# Patient Record
Sex: MALE | Race: WHITE | HISPANIC OR LATINO | Employment: UNEMPLOYED | URBAN - METROPOLITAN AREA
[De-identification: names, ages, dates, MRNs, and addresses within clinical notes are randomized per-mention and may not be internally consistent; named-entity substitution may affect disease eponyms.]

---

## 2020-10-07 ENCOUNTER — HOSPITAL ENCOUNTER (INPATIENT)
Facility: MEDICAL CENTER | Age: 50
LOS: 4 days | DRG: 177 | End: 2020-10-12
Attending: EMERGENCY MEDICINE | Admitting: INTERNAL MEDICINE
Payer: OTHER GOVERNMENT

## 2020-10-07 DIAGNOSIS — J18.9 PNEUMONIA OF LOWER LOBE DUE TO INFECTIOUS ORGANISM, UNSPECIFIED LATERALITY: ICD-10-CM

## 2020-10-07 DIAGNOSIS — U07.1 COVID-19: ICD-10-CM

## 2020-10-07 DIAGNOSIS — J96.01 ACUTE RESPIRATORY FAILURE WITH HYPOXIA (HCC): ICD-10-CM

## 2020-10-07 PROCEDURE — 96375 TX/PRO/DX INJ NEW DRUG ADDON: CPT

## 2020-10-07 PROCEDURE — 96374 THER/PROPH/DIAG INJ IV PUSH: CPT

## 2020-10-07 PROCEDURE — 99285 EMERGENCY DEPT VISIT HI MDM: CPT

## 2020-10-07 NOTE — LETTER
10/13/2020               Chao Loving  [address] [facility] [address]        Dear Chao (MR#[id number]),      This letter is to inform you that your COVID-19 test result is POSITIVE.  This means that the virus that causes COVID-19 was found in your sample.      A review of your test during your recent visit requires that we notify you of the following:    Your nasal swab was POSITIVE for the novel coronavirus (COVID-19).     The Health Department will be in contact with you soon.      You are encouraged to continue to quarantine and self-isolate according to the CDC guidance unless otherwise directed by the Health Department.  Per the CDC, you should continue to quarantine until: At least 3 days (72 hours) have passed since your fever resolved without the use of fever-reducing medications and you had improvement in your cough and shortness of breath.  You should also remain quarantined until at least 10 days have passed since your symptoms first appeared.    Once any symptoms have resolved, it may be possible to donate plasma to help others that are currently ill with COVID-19. To learn more and apply, please contact the  at [phone] or via e-mail at [email or web address].    For any further questions regarding COVID-19, please contact the [facility] at [phone].  Thank you for your cooperation in the matter.      Sincerely,      [facility] Care Team

## 2020-10-07 NOTE — LETTER
"Estimado Chao Loving:    Las personas que se hayan recuperado por completo de la COVID-19 pueden tener anticuerpos en bloom plasma (denominado \"plasma convaleciente\") que generan maria g respuesta inmune, y esto podría usarse para tratar a pacientes gravemente enfermos con la COVID-19.    [facility] está entusiasmado por comenzar a trabajar con [employer] en la recolección del plasma convaleciente de personas que se arellano recuperado de la COVID-19 nette parte de un programa para tratar a pacientes infectados con el virus. Lindsey \"fármaco nuevo en fase de investigación clínica de emergencia\" aprobado por la Administración de Alimentos y Medicamentos (Food and Drug Administration, FDA) es un producto hemoderivado especial que contiene anticuerpos los que quizá pueden brindar a los pacientes un refuerzo adicional para combatir el virus.     Para ser elegible para donar plasma convaleciente, debe emerson tenido un diagnóstico previo de COVID-19 documentado a partir de maria g prueba de laboratorio (o un resultado positivo de anticuerpos de SARS-CoV-2) y cumplir con requisitos de elegibilidad adicionales.    Si está interesado en donar plasma convaleciente, NO PROGRAME MARIA G STEVEN PARA EL PLASMA NORMAL con [employer]. Los donantes deben cumplir con criterios adicionales para ser elegibles para donar plasma convaleciente. Si desea obtener más información y presentar bloom solicitud, visite [email or web address]    Atentamente,    [facility]    "

## 2020-10-08 ENCOUNTER — APPOINTMENT (OUTPATIENT)
Dept: RADIOLOGY | Facility: MEDICAL CENTER | Age: 50
DRG: 177 | End: 2020-10-08
Attending: EMERGENCY MEDICINE
Payer: OTHER GOVERNMENT

## 2020-10-08 PROBLEM — J96.00 ACUTE RESPIRATORY FAILURE (HCC): Status: ACTIVE | Noted: 2020-10-08

## 2020-10-08 PROBLEM — J18.9 PNEUMONIA DUE TO INFECTIOUS ORGANISM: Status: ACTIVE | Noted: 2020-10-08

## 2020-10-08 LAB
ALBUMIN SERPL BCP-MCNC: 3.6 G/DL (ref 3.2–4.9)
ALBUMIN/GLOB SERPL: 1 G/DL
ALP SERPL-CCNC: 81 U/L (ref 30–99)
ALT SERPL-CCNC: 50 U/L (ref 2–50)
ANION GAP SERPL CALC-SCNC: 11 MMOL/L (ref 7–16)
APTT PPP: 28.5 SEC (ref 24.7–36)
AST SERPL-CCNC: 25 U/L (ref 12–45)
BASOPHILS # BLD AUTO: 0.1 % (ref 0–1.8)
BASOPHILS # BLD: 0.01 K/UL (ref 0–0.12)
BILIRUB SERPL-MCNC: 0.5 MG/DL (ref 0.1–1.5)
BUN SERPL-MCNC: 18 MG/DL (ref 8–22)
CALCIUM SERPL-MCNC: 8.8 MG/DL (ref 8.5–10.5)
CHLORIDE SERPL-SCNC: 101 MMOL/L (ref 96–112)
CO2 SERPL-SCNC: 24 MMOL/L (ref 20–33)
COVID ORDER STATUS COVID19: NORMAL
CREAT SERPL-MCNC: 0.74 MG/DL (ref 0.5–1.4)
D DIMER PPP IA.FEU-MCNC: 0.77 UG/ML (FEU) (ref 0–0.5)
EKG IMPRESSION: NORMAL
EOSINOPHIL # BLD AUTO: 0 K/UL (ref 0–0.51)
EOSINOPHIL NFR BLD: 0 % (ref 0–6.9)
ERYTHROCYTE [DISTWIDTH] IN BLOOD BY AUTOMATED COUNT: 41.6 FL (ref 35.9–50)
FERRITIN SERPL-MCNC: 913 NG/ML (ref 22–322)
FLUAV RNA SPEC QL NAA+PROBE: NEGATIVE
FLUBV RNA SPEC QL NAA+PROBE: NEGATIVE
GLOBULIN SER CALC-MCNC: 3.6 G/DL (ref 1.9–3.5)
GLUCOSE SERPL-MCNC: 112 MG/DL (ref 65–99)
HCT VFR BLD AUTO: 43.3 % (ref 42–52)
HGB BLD-MCNC: 14.3 G/DL (ref 14–18)
IMM GRANULOCYTES # BLD AUTO: 0.08 K/UL (ref 0–0.11)
IMM GRANULOCYTES NFR BLD AUTO: 0.6 % (ref 0–0.9)
INR PPP: 1.03 (ref 0.87–1.13)
LACTATE BLD-SCNC: 1 MMOL/L (ref 0.5–2)
LACTATE BLD-SCNC: 1.2 MMOL/L (ref 0.5–2)
LACTATE BLD-SCNC: 2 MMOL/L (ref 0.5–2)
LYMPHOCYTES # BLD AUTO: 0.96 K/UL (ref 1–4.8)
LYMPHOCYTES NFR BLD: 7.2 % (ref 22–41)
MCH RBC QN AUTO: 29.4 PG (ref 27–33)
MCHC RBC AUTO-ENTMCNC: 33 G/DL (ref 33.7–35.3)
MCV RBC AUTO: 88.9 FL (ref 81.4–97.8)
MONOCYTES # BLD AUTO: 0.43 K/UL (ref 0–0.85)
MONOCYTES NFR BLD AUTO: 3.2 % (ref 0–13.4)
NEUTROPHILS # BLD AUTO: 11.84 K/UL (ref 1.82–7.42)
NEUTROPHILS NFR BLD: 88.9 % (ref 44–72)
NRBC # BLD AUTO: 0 K/UL
NRBC BLD-RTO: 0 /100 WBC
NT-PROBNP SERPL IA-MCNC: 39 PG/ML (ref 0–125)
PLATELET # BLD AUTO: 298 K/UL (ref 164–446)
PMV BLD AUTO: 10.6 FL (ref 9–12.9)
POTASSIUM SERPL-SCNC: 4.1 MMOL/L (ref 3.6–5.5)
PROCALCITONIN SERPL-MCNC: <0.05 NG/ML
PROT SERPL-MCNC: 7.2 G/DL (ref 6–8.2)
PROTHROMBIN TIME: 13.8 SEC (ref 12–14.6)
RBC # BLD AUTO: 4.87 M/UL (ref 4.7–6.1)
SARS-COV-2 RNA RESP QL NAA+PROBE: DETECTED
SODIUM SERPL-SCNC: 136 MMOL/L (ref 135–145)
SPECIMEN SOURCE: ABNORMAL
WBC # BLD AUTO: 13.3 K/UL (ref 4.8–10.8)

## 2020-10-08 PROCEDURE — 85730 THROMBOPLASTIN TIME PARTIAL: CPT

## 2020-10-08 PROCEDURE — 90471 IMMUNIZATION ADMIN: CPT

## 2020-10-08 PROCEDURE — 700111 HCHG RX REV CODE 636 W/ 250 OVERRIDE (IP): Performed by: HOSPITALIST

## 2020-10-08 PROCEDURE — 90686 IIV4 VACC NO PRSV 0.5 ML IM: CPT | Performed by: HOSPITALIST

## 2020-10-08 PROCEDURE — 36415 COLL VENOUS BLD VENIPUNCTURE: CPT

## 2020-10-08 PROCEDURE — 80053 COMPREHEN METABOLIC PANEL: CPT

## 2020-10-08 PROCEDURE — U0003 INFECTIOUS AGENT DETECTION BY NUCLEIC ACID (DNA OR RNA); SEVERE ACUTE RESPIRATORY SYNDROME CORONAVIRUS 2 (SARS-COV-2) (CORONAVIRUS DISEASE [COVID-19]), AMPLIFIED PROBE TECHNIQUE, MAKING USE OF HIGH THROUGHPUT TECHNOLOGIES AS DESCRIBED BY CMS-2020-01-R: HCPCS

## 2020-10-08 PROCEDURE — 85379 FIBRIN DEGRADATION QUANT: CPT

## 2020-10-08 PROCEDURE — 85025 COMPLETE CBC W/AUTO DIFF WBC: CPT

## 2020-10-08 PROCEDURE — 700111 HCHG RX REV CODE 636 W/ 250 OVERRIDE (IP): Performed by: INTERNAL MEDICINE

## 2020-10-08 PROCEDURE — 83880 ASSAY OF NATRIURETIC PEPTIDE: CPT

## 2020-10-08 PROCEDURE — 71045 X-RAY EXAM CHEST 1 VIEW: CPT

## 2020-10-08 PROCEDURE — 700105 HCHG RX REV CODE 258: Performed by: INTERNAL MEDICINE

## 2020-10-08 PROCEDURE — 82728 ASSAY OF FERRITIN: CPT

## 2020-10-08 PROCEDURE — 83605 ASSAY OF LACTIC ACID: CPT

## 2020-10-08 PROCEDURE — 96375 TX/PRO/DX INJ NEW DRUG ADDON: CPT

## 2020-10-08 PROCEDURE — 93005 ELECTROCARDIOGRAM TRACING: CPT | Performed by: EMERGENCY MEDICINE

## 2020-10-08 PROCEDURE — 94760 N-INVAS EAR/PLS OXIMETRY 1: CPT

## 2020-10-08 PROCEDURE — 3E02340 INTRODUCTION OF INFLUENZA VACCINE INTO MUSCLE, PERCUTANEOUS APPROACH: ICD-10-PCS | Performed by: HOSPITALIST

## 2020-10-08 PROCEDURE — 700102 HCHG RX REV CODE 250 W/ 637 OVERRIDE(OP): Performed by: INTERNAL MEDICINE

## 2020-10-08 PROCEDURE — 84145 PROCALCITONIN (PCT): CPT

## 2020-10-08 PROCEDURE — A9270 NON-COVERED ITEM OR SERVICE: HCPCS | Performed by: INTERNAL MEDICINE

## 2020-10-08 PROCEDURE — 85610 PROTHROMBIN TIME: CPT

## 2020-10-08 PROCEDURE — C9803 HOPD COVID-19 SPEC COLLECT: HCPCS | Performed by: EMERGENCY MEDICINE

## 2020-10-08 PROCEDURE — 87040 BLOOD CULTURE FOR BACTERIA: CPT | Mod: 91

## 2020-10-08 PROCEDURE — 770020 HCHG ROOM/CARE - TELE (206)

## 2020-10-08 PROCEDURE — 94669 MECHANICAL CHEST WALL OSCILL: CPT

## 2020-10-08 PROCEDURE — 87502 INFLUENZA DNA AMP PROBE: CPT

## 2020-10-08 PROCEDURE — 700111 HCHG RX REV CODE 636 W/ 250 OVERRIDE (IP): Performed by: EMERGENCY MEDICINE

## 2020-10-08 PROCEDURE — 99221 1ST HOSP IP/OBS SF/LOW 40: CPT | Performed by: INTERNAL MEDICINE

## 2020-10-08 PROCEDURE — 96374 THER/PROPH/DIAG INJ IV PUSH: CPT

## 2020-10-08 RX ORDER — DOCUSATE SODIUM 100 MG/1
100 CAPSULE, LIQUID FILLED ORAL 2 TIMES DAILY
Status: DISCONTINUED | OUTPATIENT
Start: 2020-10-08 | End: 2020-10-12 | Stop reason: HOSPADM

## 2020-10-08 RX ORDER — ONDANSETRON 2 MG/ML
4 INJECTION INTRAMUSCULAR; INTRAVENOUS ONCE
Status: COMPLETED | OUTPATIENT
Start: 2020-10-08 | End: 2020-10-08

## 2020-10-08 RX ORDER — FLUTICASONE PROPIONATE 50 MCG
2 SPRAY, SUSPENSION (ML) NASAL DAILY
Status: DISCONTINUED | OUTPATIENT
Start: 2020-10-08 | End: 2020-10-12 | Stop reason: HOSPADM

## 2020-10-08 RX ORDER — ONDANSETRON 4 MG/1
4 TABLET, ORALLY DISINTEGRATING ORAL EVERY 6 HOURS PRN
Status: DISCONTINUED | OUTPATIENT
Start: 2020-10-08 | End: 2020-10-12 | Stop reason: HOSPADM

## 2020-10-08 RX ORDER — MORPHINE SULFATE 4 MG/ML
4 INJECTION, SOLUTION INTRAMUSCULAR; INTRAVENOUS ONCE
Status: COMPLETED | OUTPATIENT
Start: 2020-10-08 | End: 2020-10-08

## 2020-10-08 RX ORDER — ACETAMINOPHEN 325 MG/1
650 TABLET ORAL EVERY 6 HOURS PRN
Status: DISCONTINUED | OUTPATIENT
Start: 2020-10-08 | End: 2020-10-12 | Stop reason: HOSPADM

## 2020-10-08 RX ORDER — ONDANSETRON 2 MG/ML
4 INJECTION INTRAMUSCULAR; INTRAVENOUS EVERY 6 HOURS PRN
Status: DISCONTINUED | OUTPATIENT
Start: 2020-10-08 | End: 2020-10-12 | Stop reason: HOSPADM

## 2020-10-08 RX ADMIN — INFLUENZA A VIRUS A/GUANGDONG-MAONAN/SWL1536/2019 CNIC-1909 (H1N1) ANTIGEN (FORMALDEHYDE INACTIVATED), INFLUENZA A VIRUS A/HONG KONG/2671/2019 (H3N2) ANTIGEN (FORMALDEHYDE INACTIVATED), INFLUENZA B VIRUS B/PHUKET/3073/2013 ANTIGEN (FORMALDEHYDE INACTIVATED), AND INFLUENZA B VIRUS B/WASHINGTON/02/2019 ANTIGEN (FORMALDEHYDE INACTIVATED) 0.5 ML: 15; 15; 15; 15 INJECTION, SUSPENSION INTRAMUSCULAR at 17:09

## 2020-10-08 RX ADMIN — ONDANSETRON 4 MG: 2 INJECTION INTRAMUSCULAR; INTRAVENOUS at 00:46

## 2020-10-08 RX ADMIN — MORPHINE SULFATE 4 MG: 4 INJECTION INTRAVENOUS at 00:49

## 2020-10-08 RX ADMIN — CEFTRIAXONE SODIUM 1000 MG: 1 INJECTION, POWDER, FOR SOLUTION INTRAMUSCULAR; INTRAVENOUS at 06:19

## 2020-10-08 RX ADMIN — ACETAMINOPHEN 650 MG: 325 TABLET, FILM COATED ORAL at 06:11

## 2020-10-08 RX ADMIN — ENOXAPARIN SODIUM 40 MG: 40 INJECTION SUBCUTANEOUS at 06:12

## 2020-10-08 ASSESSMENT — ENCOUNTER SYMPTOMS
INSOMNIA: 0
WEIGHT LOSS: 0
STRIDOR: 0
MYALGIAS: 0
DIZZINESS: 0
VOMITING: 0
BRUISES/BLEEDS EASILY: 0
NAUSEA: 0
FEVER: 1
DOUBLE VISION: 0
BLURRED VISION: 0
NECK PAIN: 0
HEADACHES: 0
HEMOPTYSIS: 0
SPUTUM PRODUCTION: 0
SHORTNESS OF BREATH: 1
SORE THROAT: 0
DEPRESSION: 0
COUGH: 1
CHILLS: 1
PALPITATIONS: 0

## 2020-10-08 ASSESSMENT — COGNITIVE AND FUNCTIONAL STATUS - GENERAL
MOBILITY SCORE: 22
DAILY ACTIVITIY SCORE: 24
SUGGESTED CMS G CODE MODIFIER DAILY ACTIVITY: CH
SUGGESTED CMS G CODE MODIFIER MOBILITY: CJ
CLIMB 3 TO 5 STEPS WITH RAILING: A LITTLE
WALKING IN HOSPITAL ROOM: A LITTLE

## 2020-10-08 ASSESSMENT — LIFESTYLE VARIABLES
EVER FELT BAD OR GUILTY ABOUT YOUR DRINKING: NO
ON A TYPICAL DAY WHEN YOU DRINK ALCOHOL HOW MANY DRINKS DO YOU HAVE: 1
CONSUMPTION TOTAL: NEGATIVE
HAVE YOU EVER FELT YOU SHOULD CUT DOWN ON YOUR DRINKING: NO
TOTAL SCORE: 0
TOTAL SCORE: 0
ALCOHOL_USE: YES
HOW MANY TIMES IN THE PAST YEAR HAVE YOU HAD 5 OR MORE DRINKS IN A DAY: 0
EVER HAD A DRINK FIRST THING IN THE MORNING TO STEADY YOUR NERVES TO GET RID OF A HANGOVER: NO
TOTAL SCORE: 0
AVERAGE NUMBER OF DAYS PER WEEK YOU HAVE A DRINK CONTAINING ALCOHOL: 4
HAVE PEOPLE ANNOYED YOU BY CRITICIZING YOUR DRINKING: NO

## 2020-10-08 ASSESSMENT — COPD QUESTIONNAIRES
COPD SCREENING SCORE: 1
DO YOU EVER COUGH UP ANY MUCUS OR PHLEGM?: NO/ONLY WITH OCCASIONAL COLDS OR INFECTIONS
DURING THE PAST 4 WEEKS HOW MUCH DID YOU FEEL SHORT OF BREATH: NONE/LITTLE OF THE TIME
HAVE YOU SMOKED AT LEAST 100 CIGARETTES IN YOUR ENTIRE LIFE: NO/DON'T KNOW

## 2020-10-08 ASSESSMENT — PATIENT HEALTH QUESTIONNAIRE - PHQ9
SUM OF ALL RESPONSES TO PHQ9 QUESTIONS 1 AND 2: 0
1. LITTLE INTEREST OR PLEASURE IN DOING THINGS: NOT AT ALL
2. FEELING DOWN, DEPRESSED, IRRITABLE, OR HOPELESS: NOT AT ALL

## 2020-10-08 NOTE — ED NOTES
Report given to Terry ROBERSON, pt to go to S173-02, updated pt on plan of care, pt verbalized understanding.

## 2020-10-08 NOTE — ED PROVIDER NOTES
ED Provider Note        Primary care provider: No primary care provider on file.    I verified that the patient was wearing a mask and I was wearing appropriate PPE every time I entered the room. The patient's mask was on the patient at all times during my encounter except for a brief view of the oropharynx.      CHIEF COMPLAINT  Chief Complaint   Patient presents with   • Cough       HPI  Chao Loving is a 50 y.o. male who presents to the Emergency Department with chief complaint of cough.  Patient reports progressive cough over the last 5 days now having severe pain throughout his back.  States he has a history of chronic low back pain however is been exacerbated by his cough.  He is feels sharp stabbing pain in his lower back when he coughs.  He also reports sore throat large amount of mucus production and minimal difficulty breathing.  He states he has pain in all of his muscles.  He has had intermittent fevers at home that have been not measured and slight diaphoresis.  Minor nausea without emesis.  He also reports intermittent frontal headaches.  Patient states he was tested for the coronavirus at a local facility but does not have the results as of yet.    REVIEW OF SYSTEMS  10 systems reviewed and otherwise negative, pertinent positives and negatives listed in the history of present illness.      PAST MEDICAL HISTORY     Chronic lower back pain      SURGICAL HISTORY  patient denies any surgical history    SOCIAL HISTORY  Social History     Tobacco Use   • Smoking status: Not on file   Substance Use Topics   • Alcohol use: Not on file   • Drug use: Not on file      Social History     Substance and Sexual Activity   Drug Use Not on file       FAMILY HISTORY  Non-Contributory    CURRENT MEDICATIONS  Home Medications     Reviewed by Brittany Hudson R.N. (Registered Nurse) on 10/07/20 at 2055  Med List Status: Not Addressed   Medication Last Dose Status   Acetaminophen (TYLENOL EXTRA STRENGTH PO)   "Active                ALLERGIES  No Known Allergies    PHYSICAL EXAM  VITAL SIGNS: /75   Pulse 81   Temp 37.1 °C (98.8 °F) (Oral)   Resp 20   Ht 1.651 m (5' 5\")   Wt 74.8 kg (165 lb)   SpO2 93%   BMI 27.46 kg/m²   Pulse ox interpretation: I interpret this pulse ox as normal.  Constitutional: Alert and oriented x 3, minimal distress  HEENT: Atraumatic normocephalic, pupils are equal round reactive to light extraocular movements are intact. The nares is clear, external ears are normal, mouth shows moist mucous membranes  Neck: Supple, no JVD no tracheal deviation  Cardiovascular: Regular rate and rhythm no murmur rub or gallop 2+ pulses peripherally x4  Thorax & Lungs: No respiratory distress, no wheezes rales or rhonchi, No chest tenderness.   GI: Soft nontender nondistended positive bowel sounds, no peritoneal signs  Skin: Warm dry no acute rash or lesion  Musculoskeletal: Moving all extremities with full range and 5 of 5 strength, no acute  deformity  Neurologic: Cranial nerves III through XII are grossly intact, no sensory deficit, no cerebellar dysfunction   Psychiatric: Appropriate affect for situation at this time      DIAGNOSTIC STUDIES / PROCEDURES  LABS      Results for orders placed or performed during the hospital encounter of 10/07/20   COVID/SARS CoV-2 PCR    Specimen: Nasopharyngeal; Respirate   Result Value Ref Range    COVID Order Status Received    Ferritin   Result Value Ref Range    Ferritin 913.0 (H) 22.0 - 322.0 ng/mL   proBrain Natriuretic Peptide, NT   Result Value Ref Range    NT-proBNP 39 0 - 125 pg/mL   Lactic Acid   Result Value Ref Range    Lactic Acid 2.0 0.5 - 2.0 mmol/L   Lactic Acid   Result Value Ref Range    Lactic Acid 1.0 0.5 - 2.0 mmol/L   aPTT   Result Value Ref Range    APTT 28.5 24.7 - 36.0 sec   Prothrombin Time   Result Value Ref Range    PT 13.8 12.0 - 14.6 sec    INR 1.03 0.87 - 1.13   SARS-CoV-2, PCR (In-House)   Result Value Ref Range    SARS-CoV-2 Source NP " Swab     SARS-CoV-2 by PCR DETECTED (AA)    Influenza A/B By PCR (Adult - Flu Only)    Specimen: Nasopharyngeal; Respirate   Result Value Ref Range    Influenza virus A RNA Negative Negative    Influenza virus B, PCR Negative Negative   EKG   Result Value Ref Range    Report       [facility] Emergency Dept.    Test Date:  2020-10-08  Pt Name:    LÁZARO PALOMINO    Department: ER  MRN:        [id number]                      Room:       [facility]  Gender:     Male                         Technician: [id number]  :        1970                   Requested By:KENYON ARELLANO  Order #:    [id number]                    Reading MD: KENYON ARELLANO MD    Measurements  Intervals                                Axis  Rate:       68                           P:          8  ND:         128                          QRS:        -26  QRSD:       84                           T:          -6  QT:         396  QTc:        422    Interpretive Statements  SINUS RHYTHM  LEFT VENTRICULAR HYPERTROPHY  BORDERLINE T ABNORMALITIES, INFERIOR LEADS  No previous ECG available for comparison  Electronically Signed On 10-8-2020 2:15:32 PDT by KENYON ARELLANO MD         All labs reviewed by me.      RADIOLOGY  DX-CHEST-PORTABLE (1 VIEW)   Final Result      Bibasilar opacities as noted above.        The radiologist's interpretation of all radiological studies have been reviewed by me.    COURSE & MEDICAL DECISION MAKING  Pertinent Labs & Imaging studies reviewed. (See chart for details)    12:06 AM - Patient seen and examined at bedside.         Patient noted to have slightly elevated blood pressure likely circumstantial secondary to presenting complaint. Referred to primary care physician for further evaluation.    Medical Decision Making: Covid positive hypoxic requiring 4 L by nasal cannula also has elevated ferritin.  Bibasilar opacities on chest x-ray suggestive of COVID pneumonia.  CMP and CBC delayed due  "to lab downtime.  Patient however will require hospitalization due to hypoxia.  Discussed case with hospitalist and patient admitted for further evaluation and treatment.  /75   Pulse 81   Temp 37.1 °C (98.8 °F) (Oral)   Resp 20   Ht 1.651 m (5' 5\")   Wt 74.8 kg (165 lb)   SpO2 93%   BMI 27.46 kg/m²             FINAL IMPRESSION  1.  Cough  2.  Shortness of breath  3.  COVID-19 pneumonia  4.  Back pain      This dictation has been created using voice recognition software and/or scribes. The accuracy of the dictation is limited by the abilities of the software and the expertise of the scribes. I expect there may be some errors of grammar and possibly content. I made every attempt to manually correct the errors within my dictation. However, errors related to voice recognition software and/or scribes may still exist and should be interpreted within the appropriate context.            "

## 2020-10-08 NOTE — CARE PLAN
Problem: Communication  Goal: The ability to communicate needs accurately and effectively will improve  Outcome: PROGRESSING AS EXPECTED  Note: Plan of Care discussed, all questions answered. Pt effectively communicates needs to staff.       Problem: Pain Management  Goal: Pain level will decrease to patient's comfort goal  Outcome: PROGRESSING AS EXPECTED  Note: Denies pain at this time.

## 2020-10-08 NOTE — CARE PLAN
Problem: Communication  Goal: The ability to communicate needs accurately and effectively will improve  Outcome: PROGRESSING AS EXPECTED     Problem: Safety  Goal: Will remain free from injury  Outcome: PROGRESSING AS EXPECTED     Problem: Infection  Goal: Will remain free from infection  Outcome: PROGRESSING AS EXPECTED     Problem: Venous Thromboembolism (VTW)/Deep Vein Thrombosis (DVT) Prevention:  Goal: Patient will participate in Venous Thrombosis (VTE)/Deep Vein Thrombosis (DVT)Prevention Measures  Outcome: PROGRESSING AS EXPECTED     Problem: Pain Management  Goal: Pain level will decrease to patient's comfort goal  Outcome: PROGRESSING AS EXPECTED     Problem: Respiratory:  Goal: Respiratory status will improve  Outcome: PROGRESSING AS EXPECTED

## 2020-10-08 NOTE — ED NOTES
Micro called with critical result of COVID POSITIVE at 0403. Critical lab result read back to LAB.   Dr. ROMERO notified of critical lab result at 0405.  Critical lab result read back by Dr. ROMERO.

## 2020-10-08 NOTE — PROGRESS NOTES
"Patient was seen and examined by my colleague after midnight. Please refer to the H&P done by Dr. Martinez on 10/8/2020 for more details.    I personally saw and examined the patient today. He is laying in bed, no acute distress but requiring oxygen and with a mild dry cough. He reports he feels \"the same\". Procal negative. D dimer pending.         "

## 2020-10-08 NOTE — ASSESSMENT & PLAN NOTE
Secondary to pneumonia complicated by atelectasis.  Pneumonia care set, incentive spirometry, flutter valve, albuterol, Atrovent, Flonase,

## 2020-10-08 NOTE — PROGRESS NOTES
2 RN skin check complete.   Devices in place N/A.  Skin assessed under devices N/A.  Confirmed pressure ulcers found on None.  New potential pressure ulcers noted on None. Wound consult placed N/A.  The following interventions in place Pt to ambulate and reposition. Skin intact on admission.

## 2020-10-08 NOTE — ED TRIAGE NOTES
Headache, sore throat, nonproductive cough, and low back pain x 8 days.  Had Covid test in [place] last week, does not know results yet.

## 2020-10-08 NOTE — ED NOTES
Pt to S173-02 with john ROBERSON and charge RN. Pt with all belongings to go up to floor. Pt is on tele monitor and 2L of oxygen.

## 2020-10-08 NOTE — ASSESSMENT & PLAN NOTE
Empiric antibiotics for presumed community-acquired pneumonia stopped as procalcitonin level is wnl      Risk factors are COVID positive.

## 2020-10-08 NOTE — ED NOTES
Pt WC to room GRN 27, Pt having severe back pain, pt coughing aggressively, placed pt on 4L oxygen NC, pt was on 80% on room air. Pt is now >90% on 4L of oxygen. Pt placed in gown and monitor. Pt having difficulty walking due to back pain.

## 2020-10-08 NOTE — H&P
Hospital Medicine History & Physical Note    Date of Service  10/8/2020    Primary Care Physician  No primary care provider on file.    Consultants  Infectious disease    Code Status  Full Code    Chief Complaint  Chief Complaint   Patient presents with   • Cough       History of Presenting Illness  50 y.o. male who presented 10/7/2020 without past medical history who presents with approximately 7 days of viral prodrome of fever sore throat, headache, nonproductive cough and shortness of breath prompting evaluation for COVID in [place] approximately 5 days ago with unknown result.  In the emergency department he is found to have mild hypoxia of 88% on room air with nonproductive cough and chest x-ray of bilateral atelectasis versus pneumonia.  Labs are pending.  He is started on empiric antibiotics, COVID 19 precautions and referred to the hospitalist for admission.  He denies previous pneumonia or recent antibiotics.  He admits exposure to known COVID positive patient 2 weeks ago      Review of Systems  Review of Systems   Constitutional: Positive for chills and fever. Negative for malaise/fatigue and weight loss.   HENT: Negative for sore throat and tinnitus.    Eyes: Negative for blurred vision and double vision.   Respiratory: Positive for cough and shortness of breath. Negative for hemoptysis, sputum production and stridor.    Cardiovascular: Negative for chest pain and palpitations.   Gastrointestinal: Negative for nausea and vomiting.   Genitourinary: Negative for dysuria and urgency.   Musculoskeletal: Negative for myalgias and neck pain.   Skin: Negative for itching and rash.   Neurological: Negative for dizziness and headaches.   Endo/Heme/Allergies: Does not bruise/bleed easily.   Psychiatric/Behavioral: Negative for depression. The patient does not have insomnia.        Past Medical History   has no past medical history on file.    Surgical History   has no past surgical history on file.     Family  History  family history is not on file.     Social History       Allergies  No Known Allergies    Medications  Prior to Admission Medications   Prescriptions Last Dose Informant Patient Reported? Taking?   Acetaminophen (TYLENOL EXTRA STRENGTH PO)   Yes Yes   Sig: Take  by mouth.      Facility-Administered Medications: None       Physical Exam  Temp:  [36.3 °C (97.3 °F)-37.9 °C (100.2 °F)] 37.9 °C (100.2 °F)  Pulse:  [59-96] 62  Resp:  [14-20] 20  BP: (113-160)/(70-84) 113/71  SpO2:  [80 %-98 %] 97 %    Physical Exam  Vitals signs and nursing note reviewed.   Constitutional:       General: He is in acute distress.      Appearance: Normal appearance. He is normal weight. He is ill-appearing. He is not toxic-appearing.   HENT:      Head: Normocephalic and atraumatic.      Nose: Nose normal. No congestion or rhinorrhea.      Mouth/Throat:      Mouth: Mucous membranes are moist.      Pharynx: Oropharynx is clear.   Eyes:      Extraocular Movements: Extraocular movements intact.      Conjunctiva/sclera: Conjunctivae normal.      Pupils: Pupils are equal, round, and reactive to light.   Neck:      Musculoskeletal: Normal range of motion and neck supple. No muscular tenderness.      Vascular: No carotid bruit.   Cardiovascular:      Rate and Rhythm: Normal rate and regular rhythm.      Pulses: Normal pulses.      Heart sounds: Normal heart sounds. No murmur. No gallop.    Pulmonary:      Effort: Respiratory distress present.      Breath sounds: Rhonchi and rales present. No wheezing.   Abdominal:      General: Abdomen is flat. Bowel sounds are normal. There is no distension.      Palpations: Abdomen is soft. There is no mass.      Tenderness: There is no abdominal tenderness.      Hernia: No hernia is present.   Musculoskeletal:         General: No tenderness or signs of injury.   Lymphadenopathy:      Cervical: No cervical adenopathy.   Skin:     Capillary Refill: Capillary refill takes less than 2 seconds.       Coloration: Skin is not jaundiced or pale.      Findings: No bruising.   Neurological:      General: No focal deficit present.      Mental Status: He is alert and oriented to person, place, and time. Mental status is at baseline.      Cranial Nerves: No cranial nerve deficit.      Motor: No weakness.      Coordination: Coordination normal.   Psychiatric:         Mood and Affect: Mood normal.         Thought Content: Thought content normal.         Judgment: Judgment normal.         Laboratory:          No results for input(s): ALTSGPT, ASTSGOT, ALKPHOSPHAT, TBILIRUBIN, DBILIRUBIN, GAMMAGT, AMYLASE, LIPASE, ALB, PREALBUMIN, GLUCOSE in the last 72 hours.  Recent Labs     10/08/20  0052   APTT 28.5   INR 1.03     Recent Labs     10/08/20  0052   NTPROBNP 39         No results for input(s): TROPONINT in the last 72 hours.    Imaging:  DX-CHEST-PORTABLE (1 VIEW)   Final Result      Bibasilar opacities as noted above.            Assessment/Plan:  I anticipate this patient will require at least two midnights for appropriate medical management, necessitating inpatient admission.    Acute respiratory failure (HCC)  Assessment & Plan  Secondary to pneumonia complicated by atelectasis.  Pneumonia care set, incentive spirometry, flutter valve, albuterol, Atrovent, Flonase, follow-up labs consider ABG    Pneumonia due to infectious organism  Assessment & Plan  Empiric antibiotics for presumed community-acquired pneumonia.  Risk factors are COVID positive.  Follow-up COVID and influenza screen

## 2020-10-09 LAB
ANION GAP SERPL CALC-SCNC: 11 MMOL/L (ref 7–16)
BASOPHILS # BLD AUTO: 0.1 % (ref 0–1.8)
BASOPHILS # BLD: 0.01 K/UL (ref 0–0.12)
BUN SERPL-MCNC: 16 MG/DL (ref 8–22)
CALCIUM SERPL-MCNC: 8.9 MG/DL (ref 8.5–10.5)
CHLORIDE SERPL-SCNC: 99 MMOL/L (ref 96–112)
CO2 SERPL-SCNC: 25 MMOL/L (ref 20–33)
CREAT SERPL-MCNC: 0.82 MG/DL (ref 0.5–1.4)
EOSINOPHIL # BLD AUTO: 0.08 K/UL (ref 0–0.51)
EOSINOPHIL NFR BLD: 1 % (ref 0–6.9)
ERYTHROCYTE [DISTWIDTH] IN BLOOD BY AUTOMATED COUNT: 41.4 FL (ref 35.9–50)
GLUCOSE SERPL-MCNC: 90 MG/DL (ref 65–99)
HCT VFR BLD AUTO: 43.5 % (ref 42–52)
HGB BLD-MCNC: 14.4 G/DL (ref 14–18)
IMM GRANULOCYTES # BLD AUTO: 0.07 K/UL (ref 0–0.11)
IMM GRANULOCYTES NFR BLD AUTO: 0.9 % (ref 0–0.9)
LYMPHOCYTES # BLD AUTO: 1.21 K/UL (ref 1–4.8)
LYMPHOCYTES NFR BLD: 15.2 % (ref 22–41)
MCH RBC QN AUTO: 29.8 PG (ref 27–33)
MCHC RBC AUTO-ENTMCNC: 33.1 G/DL (ref 33.7–35.3)
MCV RBC AUTO: 89.9 FL (ref 81.4–97.8)
MONOCYTES # BLD AUTO: 0.47 K/UL (ref 0–0.85)
MONOCYTES NFR BLD AUTO: 5.9 % (ref 0–13.4)
NEUTROPHILS # BLD AUTO: 6.1 K/UL (ref 1.82–7.42)
NEUTROPHILS NFR BLD: 76.9 % (ref 44–72)
NRBC # BLD AUTO: 0 K/UL
NRBC BLD-RTO: 0 /100 WBC
PLATELET # BLD AUTO: 310 K/UL (ref 164–446)
PMV BLD AUTO: 10 FL (ref 9–12.9)
POTASSIUM SERPL-SCNC: 4.1 MMOL/L (ref 3.6–5.5)
RBC # BLD AUTO: 4.84 M/UL (ref 4.7–6.1)
SODIUM SERPL-SCNC: 135 MMOL/L (ref 135–145)
WBC # BLD AUTO: 7.9 K/UL (ref 4.8–10.8)

## 2020-10-09 PROCEDURE — A9270 NON-COVERED ITEM OR SERVICE: HCPCS | Performed by: INTERNAL MEDICINE

## 2020-10-09 PROCEDURE — 99232 SBSQ HOSP IP/OBS MODERATE 35: CPT | Performed by: HOSPITALIST

## 2020-10-09 PROCEDURE — 770020 HCHG ROOM/CARE - TELE (206)

## 2020-10-09 PROCEDURE — 85025 COMPLETE CBC W/AUTO DIFF WBC: CPT

## 2020-10-09 PROCEDURE — 36415 COLL VENOUS BLD VENIPUNCTURE: CPT

## 2020-10-09 PROCEDURE — 700105 HCHG RX REV CODE 258: Performed by: INTERNAL MEDICINE

## 2020-10-09 PROCEDURE — 700102 HCHG RX REV CODE 250 W/ 637 OVERRIDE(OP): Performed by: INTERNAL MEDICINE

## 2020-10-09 PROCEDURE — 700111 HCHG RX REV CODE 636 W/ 250 OVERRIDE (IP): Performed by: INTERNAL MEDICINE

## 2020-10-09 PROCEDURE — 80048 BASIC METABOLIC PNL TOTAL CA: CPT

## 2020-10-09 RX ADMIN — DOCUSATE SODIUM 100 MG: 100 CAPSULE, LIQUID FILLED ORAL at 16:39

## 2020-10-09 RX ADMIN — CEFTRIAXONE SODIUM 1000 MG: 1 INJECTION, POWDER, FOR SOLUTION INTRAMUSCULAR; INTRAVENOUS at 05:09

## 2020-10-09 RX ADMIN — ENOXAPARIN SODIUM 40 MG: 40 INJECTION SUBCUTANEOUS at 05:10

## 2020-10-09 RX ADMIN — FLUTICASONE PROPIONATE 100 MCG: 50 SPRAY, METERED NASAL at 09:08

## 2020-10-09 ASSESSMENT — ENCOUNTER SYMPTOMS
GASTROINTESTINAL NEGATIVE: 1
COUGH: 1
CARDIOVASCULAR NEGATIVE: 1
NEUROLOGICAL NEGATIVE: 1
EYES NEGATIVE: 1
MUSCULOSKELETAL NEGATIVE: 1
CONSTITUTIONAL NEGATIVE: 1
PSYCHIATRIC NEGATIVE: 1

## 2020-10-09 ASSESSMENT — PATIENT HEALTH QUESTIONNAIRE - PHQ9
2. FEELING DOWN, DEPRESSED, IRRITABLE, OR HOPELESS: NOT AT ALL
SUM OF ALL RESPONSES TO PHQ9 QUESTIONS 1 AND 2: 0
1. LITTLE INTEREST OR PLEASURE IN DOING THINGS: NOT AT ALL

## 2020-10-09 ASSESSMENT — FIBROSIS 4 INDEX: FIB4 SCORE: 0.59

## 2020-10-09 NOTE — PROGRESS NOTES
Bedside report received from night RN. Pt sleeping. No distress noted on 2L NC. Bed in low position. Call light and belongings within reach

## 2020-10-09 NOTE — PROGRESS NOTES
Hospital Medicine Daily Progress Note    Date of Service  10/9/2020    Chief Complaint  50 y.o. male admitted 10/7/2020 with sob and cough    Hospital Course    *      Interval Problem Update  Breathing is better    cough is less    Floor RN titrating down oxygen        Consultants/Specialty  none    Code Status  Full Code    Disposition  home    Review of Systems  Review of Systems   Constitutional: Negative.    HENT: Negative.    Eyes: Negative.    Respiratory: Positive for cough.    Cardiovascular: Negative.    Gastrointestinal: Negative.    Genitourinary: Negative.    Musculoskeletal: Negative.    Skin: Negative.    Neurological: Negative.    Psychiatric/Behavioral: Negative.         Physical Exam  Temp:  [36.2 °C (97.2 °F)-37.1 °C (98.8 °F)] 36.2 °C (97.2 °F)  Pulse:  [60-79] 62  Resp:  [17-18] 18  BP: (109-121)/(72-79) 113/74  SpO2:  [90 %-95 %] 93 %    Physical Exam  Constitutional:       General: He is not in acute distress.     Appearance: Normal appearance. He is not ill-appearing or toxic-appearing.   HENT:      Head: Atraumatic.      Mouth/Throat:      Mouth: Mucous membranes are moist.   Eyes:      General: No scleral icterus.        Left eye: No discharge.      Extraocular Movements: Extraocular movements intact.      Pupils: Pupils are equal, round, and reactive to light.   Neck:      Musculoskeletal: Normal range of motion.   Cardiovascular:      Rate and Rhythm: Normal rate and regular rhythm.      Pulses: Normal pulses.   Pulmonary:      Breath sounds: Rhonchi present. No wheezing.   Abdominal:      Palpations: Abdomen is soft. There is no mass.      Tenderness: There is no abdominal tenderness. There is no guarding or rebound.      Hernia: No hernia is present.   Musculoskeletal: Normal range of motion.   Skin:     General: Skin is warm and dry.      Capillary Refill: Capillary refill takes 2 to 3 seconds.   Neurological:      Mental Status: He is alert and oriented to person, place, and time.          Fluids  No intake or output data in the 24 hours ending 10/09/20 1353    Laboratory  Recent Labs     10/08/20  0742 10/09/20  0028   WBC 13.3* 7.9   RBC 4.87 4.84   HEMOGLOBIN 14.3 14.4   HEMATOCRIT 43.3 43.5   MCV 88.9 89.9   MCH 29.4 29.8   MCHC 33.0* 33.1*   RDW 41.6 41.4   PLATELETCT 298 310   MPV 10.6 10.0     Recent Labs     10/08/20  0742 10/09/20  0028   SODIUM 136 135   POTASSIUM 4.1 4.1   CHLORIDE 101 99   CO2 24 25   GLUCOSE 112* 90   BUN 18 16   CREATININE 0.74 0.82   CALCIUM 8.8 8.9     Recent Labs     10/08/20  0052   APTT 28.5   INR 1.03               Imaging  DX-CHEST-PORTABLE (1 VIEW)   Final Result      Bibasilar opacities as noted above.           Assessment/Plan  Acute respiratory failure (HCC)  Assessment & Plan  Secondary to pneumonia complicated by atelectasis.  Pneumonia care set, incentive spirometry, flutter valve, albuterol, Atrovent, Flonase,     Pneumonia due to infectious organism- (present on admission)  Assessment & Plan  Empiric antibiotics for presumed community-acquired pneumonia stopped as procalcitonin level is wnl      Risk factors are COVID positive.          VTE prophylaxis: scd

## 2020-10-10 LAB
ANION GAP SERPL CALC-SCNC: 11 MMOL/L (ref 7–16)
BUN SERPL-MCNC: 16 MG/DL (ref 8–22)
CALCIUM SERPL-MCNC: 8.8 MG/DL (ref 8.5–10.5)
CHLORIDE SERPL-SCNC: 99 MMOL/L (ref 96–112)
CO2 SERPL-SCNC: 25 MMOL/L (ref 20–33)
CREAT SERPL-MCNC: 0.69 MG/DL (ref 0.5–1.4)
ERYTHROCYTE [DISTWIDTH] IN BLOOD BY AUTOMATED COUNT: 41.5 FL (ref 35.9–50)
GLUCOSE SERPL-MCNC: 98 MG/DL (ref 65–99)
HCT VFR BLD AUTO: 45 % (ref 42–52)
HGB BLD-MCNC: 14.8 G/DL (ref 14–18)
MCH RBC QN AUTO: 29.8 PG (ref 27–33)
MCHC RBC AUTO-ENTMCNC: 32.9 G/DL (ref 33.7–35.3)
MCV RBC AUTO: 90.5 FL (ref 81.4–97.8)
PLATELET # BLD AUTO: 352 K/UL (ref 164–446)
PMV BLD AUTO: 9.6 FL (ref 9–12.9)
POTASSIUM SERPL-SCNC: 3.9 MMOL/L (ref 3.6–5.5)
RBC # BLD AUTO: 4.97 M/UL (ref 4.7–6.1)
SODIUM SERPL-SCNC: 135 MMOL/L (ref 135–145)
WBC # BLD AUTO: 7.3 K/UL (ref 4.8–10.8)

## 2020-10-10 PROCEDURE — 80048 BASIC METABOLIC PNL TOTAL CA: CPT

## 2020-10-10 PROCEDURE — 770020 HCHG ROOM/CARE - TELE (206)

## 2020-10-10 PROCEDURE — 700111 HCHG RX REV CODE 636 W/ 250 OVERRIDE (IP): Performed by: INTERNAL MEDICINE

## 2020-10-10 PROCEDURE — 36415 COLL VENOUS BLD VENIPUNCTURE: CPT

## 2020-10-10 PROCEDURE — 85027 COMPLETE CBC AUTOMATED: CPT

## 2020-10-10 PROCEDURE — 700102 HCHG RX REV CODE 250 W/ 637 OVERRIDE(OP): Performed by: INTERNAL MEDICINE

## 2020-10-10 PROCEDURE — A9270 NON-COVERED ITEM OR SERVICE: HCPCS | Performed by: INTERNAL MEDICINE

## 2020-10-10 PROCEDURE — 99232 SBSQ HOSP IP/OBS MODERATE 35: CPT | Performed by: HOSPITALIST

## 2020-10-10 RX ADMIN — FLUTICASONE PROPIONATE 100 MCG: 50 SPRAY, METERED NASAL at 05:12

## 2020-10-10 RX ADMIN — ENOXAPARIN SODIUM 40 MG: 40 INJECTION SUBCUTANEOUS at 05:14

## 2020-10-10 ASSESSMENT — PAIN DESCRIPTION - PAIN TYPE: TYPE: ACUTE PAIN

## 2020-10-10 ASSESSMENT — ENCOUNTER SYMPTOMS
PSYCHIATRIC NEGATIVE: 1
COUGH: 1
GASTROINTESTINAL NEGATIVE: 1
NEUROLOGICAL NEGATIVE: 1
EYES NEGATIVE: 1
CONSTITUTIONAL NEGATIVE: 1
MUSCULOSKELETAL NEGATIVE: 1
CARDIOVASCULAR NEGATIVE: 1

## 2020-10-10 ASSESSMENT — FIBROSIS 4 INDEX: FIB4 SCORE: 0.52

## 2020-10-10 NOTE — PROGRESS NOTES
Hospital Medicine Daily Progress Note    Date of Service  10/10/2020    Chief Complaint  50 y.o. male admitted 10/7/2020 with sob and cough    Hospital Course    *      Interval Problem Update    The patient has no new complaints respiratory status is not normal yet he still has no shortness breath with exertion and some mild cough which is nonproductive however his oxygen requirement did worsen overnight      Patient is currently on 2 L with saturations of 94%  I have notified  of setting up home oxygen          Consultants/Specialty  none    Code Status  Full Code    Disposition  home    Review of Systems  Review of Systems   Constitutional: Negative.    HENT: Negative.    Eyes: Negative.    Respiratory: Positive for cough.    Cardiovascular: Negative.    Gastrointestinal: Negative.    Genitourinary: Negative.    Musculoskeletal: Negative.    Skin: Negative.    Neurological: Negative.    Psychiatric/Behavioral: Negative.         Physical Exam  Temp:  [35.9 °C (96.6 °F)-36.6 °C (97.8 °F)] 36.3 °C (97.4 °F)  Pulse:  [60-66] 61  Resp:  [18] 18  BP: (104-121)/(63-78) 108/71  SpO2:  [87 %-94 %] 94 %    Physical Exam  Constitutional:       General: He is not in acute distress.     Appearance: Normal appearance. He is not ill-appearing or toxic-appearing.   HENT:      Head: Atraumatic.      Mouth/Throat:      Mouth: Mucous membranes are moist.   Eyes:      General: No scleral icterus.        Left eye: No discharge.      Extraocular Movements: Extraocular movements intact.      Pupils: Pupils are equal, round, and reactive to light.   Neck:      Musculoskeletal: Normal range of motion.   Cardiovascular:      Rate and Rhythm: Normal rate and regular rhythm.      Pulses: Normal pulses.   Pulmonary:      Breath sounds: Rhonchi present. No wheezing.   Abdominal:      Palpations: Abdomen is soft. There is no mass.      Tenderness: There is no abdominal tenderness. There is no guarding or rebound.      Hernia: No  hernia is present.   Musculoskeletal: Normal range of motion.   Skin:     General: Skin is warm and dry.      Capillary Refill: Capillary refill takes 2 to 3 seconds.   Neurological:      Mental Status: He is alert and oriented to person, place, and time.         Fluids    Intake/Output Summary (Last 24 hours) at 10/10/2020 1302  Last data filed at 10/10/2020 1000  Gross per 24 hour   Intake 560 ml   Output --   Net 560 ml       Laboratory  Recent Labs     10/08/20  0742 10/09/20  0028 10/10/20  0035   WBC 13.3* 7.9 7.3   RBC 4.87 4.84 4.97   HEMOGLOBIN 14.3 14.4 14.8   HEMATOCRIT 43.3 43.5 45.0   MCV 88.9 89.9 90.5   MCH 29.4 29.8 29.8   MCHC 33.0* 33.1* 32.9*   RDW 41.6 41.4 41.5   PLATELETCT 298 310 352   MPV 10.6 10.0 9.6     Recent Labs     10/08/20  0742 10/09/20  0028 10/10/20  0035   SODIUM 136 135 135   POTASSIUM 4.1 4.1 3.9   CHLORIDE 101 99 99   CO2 24 25 25   GLUCOSE 112* 90 98   BUN 18 16 16   CREATININE 0.74 0.82 0.69   CALCIUM 8.8 8.9 8.8     Recent Labs     10/08/20  0052   APTT 28.5   INR 1.03               Imaging  DX-CHEST-PORTABLE (1 VIEW)   Final Result      Bibasilar opacities as noted above.           Assessment/Plan  Acute respiratory failure (HCC)- (present on admission)  Assessment & Plan  Secondary to pneumonia complicated by atelectasis.  Pneumonia care set, incentive spirometry, flutter valve, albuterol, Atrovent, Flonase,     Pneumonia due to infectious organism- (present on admission)  Assessment & Plan  Empiric antibiotics for presumed community-acquired pneumonia stopped as procalcitonin level is wnl      Risk factors are COVID positive.        Set up home oxygen  VTE prophylaxis: scd

## 2020-10-10 NOTE — PROGRESS NOTES
Pt 96% on 2L NC at rest. 94% on 2L while ambulating. Pt 87% on room air at rest. 84% while ambulating on room air.

## 2020-10-10 NOTE — CARE PLAN
Problem: Infection  Goal: Will remain free from infection  Outcome: PROGRESSING AS EXPECTED     Problem: Venous Thromboembolism (VTW)/Deep Vein Thrombosis (DVT) Prevention:  Goal: Patient will participate in Venous Thrombosis (VTE)/Deep Vein Thrombosis (DVT)Prevention Measures  Outcome: PROGRESSING AS EXPECTED     Problem: Bowel/Gastric:  Goal: Normal bowel function is maintained or improved  Outcome: PROGRESSING AS EXPECTED  Goal: Will not experience complications related to bowel motility  Outcome: PROGRESSING AS EXPECTED     Problem: Knowledge Deficit  Goal: Knowledge of disease process/condition, treatment plan, diagnostic tests, and medications will improve  Outcome: PROGRESSING AS EXPECTED  Goal: Knowledge of the prescribed therapeutic regimen will improve  Outcome: PROGRESSING AS EXPECTED     Problem: Respiratory:  Goal: Respiratory status will improve  Outcome: PROGRESSING AS EXPECTED

## 2020-10-10 NOTE — FACE TO FACE
"Face to Face Note  -  Durable Medical Equipment    Jimy Perkins M.D. - NPI: [id number]  I certify that this patient is under my care and that they had a durable medical equipment(DME)face to face encounter by myself that meets the physician DME face-to-face encounter requirements with this patient on:    Date of encounter:   Patient:                    MRN:                       YOB: 2022  Chao Alonzoierrez  [id number]  1970     The encounter with the patient was in whole, or in part, for the following medical condition, which is the primary reason for durable medical equipment:  Other - covid 19 pneumonia    I certify that, based on my findings, the following durable medical equipment is medically necessary:  Oxygen.    HOME O2 Saturation Measurements:(Values must be present for Home Oxygen orders)         ,     ,         My Clinical findings support the need for the above equipment due to:  Hypoxia    Supporting Symptoms: The patient requires supplemental oxygen, as the following interventions have been tried with limited or no improvement: \"Bronchodilators and/or steroid inhalers    "

## 2020-10-10 NOTE — DISCHARGE PLANNING
Received Choice form at [id number]  Agency/Facility Name: [facility]   Referral sent per Choice form @ [id number]

## 2020-10-10 NOTE — DISCHARGE PLANNING
Anticipated Discharge Disposition: home with home oxygen    Action: This writer used ipad certified interrupter line to speak with pt about home oxygen. Pt confirmed he does not have medical insurance. Pt reports he believes he can pay out of pocket for oxygen and agreed for [facility]    Barriers to Discharge: No insurance and DME delivered on a weekend.     Plan: Wait for [facility] to accept pt, pt make finance arrangement with DME company, and DME is delivered.

## 2020-10-10 NOTE — FACE TO FACE
"Face to Face Note  -  Durable Medical Equipment    Jimy Perkins M.D. - NPI: [id number]  I certify that this patient is under my care and that they had a durable medical equipment(DME)face to face encounter by myself that meets the physician DME face-to-face encounter requirements with this patient on:    Date of encounter:   Patient:                    MRN:                       YOB: 2022  Chao Loving  [id number]  1970     The encounter with the patient was in whole, or in part, for the following medical condition, which is the primary reason for durable medical equipment:  Other - covid 19 pneumonia    I certify that, based on my findings, the following durable medical equipment is medically necessary:  Oxygen.    HOME O2 Saturation Measurements:(Values must be present for Home Oxygen orders)  Room air sat at rest: 87  Room air sat with amb: 84  With liters of O2: 2, O2 sat at rest with O2: 96  With Liters of O2: 2, O2 sat with amb with O2 : 94  Is the patient mobile?: Yes    My Clinical findings support the need for the above equipment due to:  Hypoxia    Supporting Symptoms: The patient requires supplemental oxygen, as the following interventions have been tried with limited or no improvement: \"Bronchodilators and/or steroid inhalers    "

## 2020-10-11 PROCEDURE — A9270 NON-COVERED ITEM OR SERVICE: HCPCS | Performed by: INTERNAL MEDICINE

## 2020-10-11 PROCEDURE — 770020 HCHG ROOM/CARE - TELE (206)

## 2020-10-11 PROCEDURE — 700111 HCHG RX REV CODE 636 W/ 250 OVERRIDE (IP): Performed by: INTERNAL MEDICINE

## 2020-10-11 PROCEDURE — 99232 SBSQ HOSP IP/OBS MODERATE 35: CPT | Performed by: HOSPITALIST

## 2020-10-11 PROCEDURE — 700102 HCHG RX REV CODE 250 W/ 637 OVERRIDE(OP): Performed by: INTERNAL MEDICINE

## 2020-10-11 RX ADMIN — ENOXAPARIN SODIUM 40 MG: 40 INJECTION SUBCUTANEOUS at 04:27

## 2020-10-11 RX ADMIN — FLUTICASONE PROPIONATE 100 MCG: 50 SPRAY, METERED NASAL at 04:27

## 2020-10-11 RX ADMIN — DOCUSATE SODIUM 100 MG: 100 CAPSULE, LIQUID FILLED ORAL at 04:27

## 2020-10-11 ASSESSMENT — PAIN DESCRIPTION - PAIN TYPE: TYPE: ACUTE PAIN

## 2020-10-11 ASSESSMENT — ENCOUNTER SYMPTOMS
GASTROINTESTINAL NEGATIVE: 1
COUGH: 1
NEUROLOGICAL NEGATIVE: 1
MUSCULOSKELETAL NEGATIVE: 1
CONSTITUTIONAL NEGATIVE: 1
PSYCHIATRIC NEGATIVE: 1
CARDIOVASCULAR NEGATIVE: 1
EYES NEGATIVE: 1

## 2020-10-11 ASSESSMENT — FIBROSIS 4 INDEX: FIB4 SCORE: 0.52

## 2020-10-11 NOTE — FACE TO FACE
"Face to Face Note  -  Durable Medical Equipment    Jimy Perkins M.D. - NPI: [id number]  I certify that this patient is under my care and that they had a durable medical equipment(DME)face to face encounter by myself that meets the physician DME face-to-face encounter requirements with this patient on:    Date of encounter:   Patient:                    MRN:                       YOB: 2022  Chao Loving  [id number]  1970     The encounter with the patient was in whole, or in part, for the following medical condition, which is the primary reason for durable medical equipment:  Other - covid 19 pneumonia    I certify that, based on my findings, the following durable medical equipment is medically necessary:  Oxygen.    HOME O2 Saturation Measurements:(Values must be present for Home Oxygen orders)  Room air sat at rest: 89  Room air sat with amb: 86  With liters of O2: 3, O2 sat at rest with O2: 90  With Liters of O2: 4, O2 sat with amb with O2 : 93  Is the patient mobile?: Yes    My Clinical findings support the need for the above equipment due to:  Hypoxia    Supporting Symptoms: The patient requires supplemental oxygen, as the following interventions have been tried with limited or no improvement: \"Bronchodilators and/or steroid inhalers    "

## 2020-10-11 NOTE — CARE PLAN
Problem: Infection  Goal: Will remain free from infection  Outcome: PROGRESSING AS EXPECTED     Problem: Respiratory:  Goal: Respiratory status will improve  Outcome: PROGRESSING AS EXPECTED     Patient afebrile, will continue to monitor.  Patient on 2L on NC, will attempt to wean when appropriate.

## 2020-10-12 ENCOUNTER — PHARMACY VISIT (OUTPATIENT)
Dept: PHARMACY | Facility: MEDICAL CENTER | Age: 50
End: 2020-10-12
Payer: COMMERCIAL

## 2020-10-12 ENCOUNTER — PATIENT OUTREACH (OUTPATIENT)
Dept: HEALTH INFORMATION MANAGEMENT | Facility: OTHER | Age: 50
End: 2020-10-12

## 2020-10-12 VITALS
HEIGHT: 65 IN | TEMPERATURE: 96.7 F | WEIGHT: 131.84 LBS | RESPIRATION RATE: 18 BRPM | BODY MASS INDEX: 21.97 KG/M2 | DIASTOLIC BLOOD PRESSURE: 78 MMHG | SYSTOLIC BLOOD PRESSURE: 109 MMHG | HEART RATE: 60 BPM | OXYGEN SATURATION: 94 %

## 2020-10-12 PROCEDURE — A9270 NON-COVERED ITEM OR SERVICE: HCPCS | Performed by: INTERNAL MEDICINE

## 2020-10-12 PROCEDURE — RXMED WILLOW AMBULATORY MEDICATION CHARGE: Performed by: HOSPITALIST

## 2020-10-12 PROCEDURE — 700102 HCHG RX REV CODE 250 W/ 637 OVERRIDE(OP): Performed by: INTERNAL MEDICINE

## 2020-10-12 PROCEDURE — 99239 HOSP IP/OBS DSCHRG MGMT >30: CPT | Performed by: HOSPITALIST

## 2020-10-12 PROCEDURE — 700111 HCHG RX REV CODE 636 W/ 250 OVERRIDE (IP): Performed by: INTERNAL MEDICINE

## 2020-10-12 RX ORDER — DEXAMETHASONE 4 MG/1
6 TABLET ORAL DAILY
Qty: 15 TAB | Refills: 0 | Status: SHIPPED | OUTPATIENT
Start: 2020-10-12 | End: 2022-01-19

## 2020-10-12 RX ADMIN — FLUTICASONE PROPIONATE 100 MCG: 50 SPRAY, METERED NASAL at 04:21

## 2020-10-12 RX ADMIN — ENOXAPARIN SODIUM 40 MG: 40 INJECTION SUBCUTANEOUS at 04:21

## 2020-10-12 RX ADMIN — DOCUSATE SODIUM 100 MG: 100 CAPSULE, LIQUID FILLED ORAL at 04:21

## 2020-10-12 NOTE — PROGRESS NOTES
Hospital Medicine Daily Progress Note    Date of Service  10/11/2020    Chief Complaint  50 y.o. male admitted 10/7/2020 with sob and cough    Hospital Course    *      Interval Problem Update      Patient feels ok when oxygen is running      Patient is currently on 2 L with saturations of 94%      Home oxygen ordered          Consultants/Specialty  none    Code Status  Full Code    Disposition  home    Review of Systems  Review of Systems   Constitutional: Negative.    HENT: Negative.    Eyes: Negative.    Respiratory: Positive for cough.    Cardiovascular: Negative.    Gastrointestinal: Negative.    Genitourinary: Negative.    Musculoskeletal: Negative.    Skin: Negative.    Neurological: Negative.    Psychiatric/Behavioral: Negative.         Physical Exam  Temp:  [35.8 °C (96.5 °F)-36.7 °C (98 °F)] 36.3 °C (97.3 °F)  Pulse:  [56-73] 66  Resp:  [16-18] 16  BP: (105-131)/(70-81) 105/77  SpO2:  [90 %-95 %] 91 %    Physical Exam  Constitutional:       General: He is not in acute distress.     Appearance: Normal appearance. He is not ill-appearing or toxic-appearing.   HENT:      Head: Atraumatic.      Mouth/Throat:      Mouth: Mucous membranes are moist.   Eyes:      General: No scleral icterus.        Left eye: No discharge.      Extraocular Movements: Extraocular movements intact.      Pupils: Pupils are equal, round, and reactive to light.   Neck:      Musculoskeletal: Normal range of motion.   Cardiovascular:      Rate and Rhythm: Normal rate and regular rhythm.      Pulses: Normal pulses.   Pulmonary:      Breath sounds: Rhonchi present. No wheezing.   Abdominal:      Palpations: Abdomen is soft. There is no mass.      Tenderness: There is no abdominal tenderness. There is no guarding or rebound.      Hernia: No hernia is present.   Musculoskeletal: Normal range of motion.   Skin:     General: Skin is warm and dry.      Capillary Refill: Capillary refill takes 2 to 3 seconds.   Neurological:      Mental Status:  He is alert and oriented to person, place, and time.         Fluids    Intake/Output Summary (Last 24 hours) at 10/11/2020 1911  Last data filed at 10/11/2020 1300  Gross per 24 hour   Intake 600 ml   Output --   Net 600 ml       Laboratory  Recent Labs     10/09/20  0028 10/10/20  0035   WBC 7.9 7.3   RBC 4.84 4.97   HEMOGLOBIN 14.4 14.8   HEMATOCRIT 43.5 45.0   MCV 89.9 90.5   MCH 29.8 29.8   MCHC 33.1* 32.9*   RDW 41.4 41.5   PLATELETCT 310 352   MPV 10.0 9.6     Recent Labs     10/09/20  0028 10/10/20  0035   SODIUM 135 135   POTASSIUM 4.1 3.9   CHLORIDE 99 99   CO2 25 25   GLUCOSE 90 98   BUN 16 16   CREATININE 0.82 0.69   CALCIUM 8.9 8.8                   Imaging  DX-CHEST-PORTABLE (1 VIEW)   Final Result      Bibasilar opacities as noted above.           Assessment/Plan  Acute respiratory failure (HCC)- (present on admission)  Assessment & Plan  Secondary to pneumonia complicated by atelectasis.  Pneumonia care set, incentive spirometry, flutter valve, albuterol, Atrovent, Flonase,     Pneumonia due to infectious organism- (present on admission)  Assessment & Plan  Empiric antibiotics for presumed community-acquired pneumonia stopped as procalcitonin level is wnl      Risk factors are COVID positive.        Set up home oxygen  VTE prophylaxis: scd       Patient

## 2020-10-12 NOTE — PROGRESS NOTES
Patient discharged to home at this time. Patient educated on discharge instructions, home medications, follow up appointment and oxygen. Patient verbalized understanding. Patient discharged in a stable condition.    DC instructions given with , Marisel MALDONADO at bedside.

## 2020-10-12 NOTE — CARE PLAN
Problem: Infection  Goal: Will remain free from infection  Outcome: PROGRESSING AS EXPECTED     Problem: Respiratory:  Goal: Respiratory status will improve  Outcome: PROGRESSING AS EXPECTED     Patient afebrile, will continue to monitor.  Patient still requiring extra O2, will attempt to wean when appropriate

## 2020-10-12 NOTE — DISCHARGE PLANNING
Medication reconcilliation completed. Medications delivered to RN at bedside. Written information regarding the dispensed prescriptions was provided to the patient including the phone number of the pharmacy to call for any questions.       Chao Kevin   Home Medication Instructions JOSÉ:[id number]    Printed on:10/12/20 1444   Medication Information                      dexamethasone (DECADRON) 4 MG Tab  Take 1.5 Tabs by mouth every day.

## 2020-10-12 NOTE — CARE PLAN
Problem: Infection  Goal: Will remain free from infection  Outcome: PROGRESSING AS EXPECTED  Note: WBC wnl     Problem: Discharge Barriers/Planning  Goal: Patient's continuum of care needs will be met  Outcome: PROGRESSING AS EXPECTED  Note: Waiting for home oxygen to be delivered.

## 2020-10-12 NOTE — DISCHARGE PLANNING
Anticipated Discharge Disposition: Home with home oxygen    Action: This RN CM informed by [facility] that oxygen tank has been delivered. This RN CM spoke to bedside RN, oxygen tank has been delivered, she is wheeling pt out for discharge, no other DC needs.     Barriers to Discharge: None.    Plan: Will continue to follow and assist with discharge planning needs.

## 2020-10-13 LAB
BACTERIA BLD CULT: NORMAL
BACTERIA BLD CULT: NORMAL
SIGNIFICANT IND 70042: NORMAL
SIGNIFICANT IND 70042: NORMAL
SITE SITE: NORMAL
SITE SITE: NORMAL
SOURCE SOURCE: NORMAL
SOURCE SOURCE: NORMAL

## 2020-10-13 NOTE — DISCHARGE SUMMARY
Discharge Summary    CHIEF COMPLAINT ON ADMISSION  Chief Complaint   Patient presents with   • Cough       Reason for Admission  Headache; Back Pain; Chills     Admission Date  10/7/2020    CODE STATUS  Prior    HPI & HOSPITAL COURSE  This is a 50 y.o. male here with no significant past medical history presents with about a one-week history of cough shortness of breath and general malaise.  He was found to be infected with COVID-19.  The patient was hypoxic and he was placed on oxygen.  His clinical status did not warrant initiation antibiotics or IV remdisvir.  The patient was given supportive care. patient's oxygen could not be titrated down and he was set up with home oxygen.. He was given p.o. Decadron.  Patient was stable for discharge on 10/12/2020 to follow-up with primary care for further care and management  *     Therefore, he is discharged in good and stable condition to home with close outpatient follow-up.    The patient met 2-midnight criteria for an inpatient stay at the time of discharge.    Discharge Date  10/12/2020    FOLLOW UP ITEMS POST DISCHARGE      DISCHARGE DIAGNOSES  Active Problems:    Pneumonia due to infectious organism POA: Yes    Acute respiratory failure (HCC) POA: Yes  Resolved Problems:    * No resolved hospital problems. *      FOLLOW UP  No future appointments.  [address] [address] [phone]  Schedule an appointment as soon as possible for a visit  Follow Up      MEDICATIONS ON DISCHARGE     Medication List      START taking these medications      Instructions   dexamethasone 4 MG Tabs  Commonly known as: DECADRON   Shelby maria g y media tableta por vía oral diariamente.  (Take 1.5 Tabs by mouth every day.)  Dose: 6 mg        CONTINUE taking these medications      Instructions   TYLENOL EXTRA STRENGTH PO   Take  by mouth.            Allergies  No Known Allergies    DIET  No orders of the defined types were placed in this  encounter.      ACTIVITY  As tolerated.  Weight bearing as tolerated    CONSULTATIONS  none    PROCEDURES      LABORATORY  Lab Results   Component Value Date    SODIUM 135 10/10/2020    POTASSIUM 3.9 10/10/2020    CHLORIDE 99 10/10/2020    CO2 25 10/10/2020    GLUCOSE 98 10/10/2020    BUN 16 10/10/2020    CREATININE 0.69 10/10/2020        Lab Results   Component Value Date    WBC 7.3 10/10/2020    HEMOGLOBIN 14.8 10/10/2020    HEMATOCRIT 45.0 10/10/2020    PLATELETCT 352 10/10/2020        Total time of the discharge process exceeds 36 minutes.

## 2020-11-17 ENCOUNTER — APPOINTMENT (OUTPATIENT)
Dept: RADIOLOGY | Facility: MEDICAL CENTER | Age: 50
End: 2020-11-17
Attending: EMERGENCY MEDICINE
Payer: OTHER GOVERNMENT

## 2020-11-17 ENCOUNTER — HOSPITAL ENCOUNTER (EMERGENCY)
Facility: MEDICAL CENTER | Age: 50
End: 2020-11-17
Attending: EMERGENCY MEDICINE
Payer: OTHER GOVERNMENT

## 2020-11-17 VITALS
DIASTOLIC BLOOD PRESSURE: 91 MMHG | OXYGEN SATURATION: 94 % | SYSTOLIC BLOOD PRESSURE: 138 MMHG | WEIGHT: 164.9 LBS | BODY MASS INDEX: 27.44 KG/M2 | RESPIRATION RATE: 28 BRPM | TEMPERATURE: 97.8 F | HEART RATE: 69 BPM

## 2020-11-17 DIAGNOSIS — R07.9 CHEST PAIN, UNSPECIFIED TYPE: ICD-10-CM

## 2020-11-17 DIAGNOSIS — U07.1 COVID-19: ICD-10-CM

## 2020-11-17 DIAGNOSIS — K21.00 GASTROESOPHAGEAL REFLUX DISEASE WITH ESOPHAGITIS WITHOUT HEMORRHAGE: ICD-10-CM

## 2020-11-17 LAB
ALBUMIN SERPL BCP-MCNC: 4.3 G/DL (ref 3.2–4.9)
ALBUMIN/GLOB SERPL: 1.5 G/DL
ALP SERPL-CCNC: 86 U/L (ref 30–99)
ALT SERPL-CCNC: 36 U/L (ref 2–50)
ANION GAP SERPL CALC-SCNC: 11 MMOL/L (ref 7–16)
AST SERPL-CCNC: 16 U/L (ref 12–45)
BASOPHILS # BLD AUTO: 0.8 % (ref 0–1.8)
BASOPHILS # BLD: 0.05 K/UL (ref 0–0.12)
BILIRUB SERPL-MCNC: 0.2 MG/DL (ref 0.1–1.5)
BUN SERPL-MCNC: 12 MG/DL (ref 8–22)
CALCIUM SERPL-MCNC: 9.7 MG/DL (ref 8.5–10.5)
CHLORIDE SERPL-SCNC: 107 MMOL/L (ref 96–112)
CO2 SERPL-SCNC: 22 MMOL/L (ref 20–33)
CREAT SERPL-MCNC: 0.77 MG/DL (ref 0.5–1.4)
EKG IMPRESSION: NORMAL
EOSINOPHIL # BLD AUTO: 0.32 K/UL (ref 0–0.51)
EOSINOPHIL NFR BLD: 5.1 % (ref 0–6.9)
ERYTHROCYTE [DISTWIDTH] IN BLOOD BY AUTOMATED COUNT: 43.4 FL (ref 35.9–50)
GLOBULIN SER CALC-MCNC: 2.8 G/DL (ref 1.9–3.5)
GLUCOSE SERPL-MCNC: 111 MG/DL (ref 65–99)
HCT VFR BLD AUTO: 45.4 % (ref 42–52)
HGB BLD-MCNC: 14.9 G/DL (ref 14–18)
IMM GRANULOCYTES # BLD AUTO: 0.05 K/UL (ref 0–0.11)
IMM GRANULOCYTES NFR BLD AUTO: 0.8 % (ref 0–0.9)
LIPASE SERPL-CCNC: 22 U/L (ref 11–82)
LYMPHOCYTES # BLD AUTO: 1.36 K/UL (ref 1–4.8)
LYMPHOCYTES NFR BLD: 21.8 % (ref 22–41)
MCH RBC QN AUTO: 29.1 PG (ref 27–33)
MCHC RBC AUTO-ENTMCNC: 32.8 G/DL (ref 33.7–35.3)
MCV RBC AUTO: 88.7 FL (ref 81.4–97.8)
MONOCYTES # BLD AUTO: 0.54 K/UL (ref 0–0.85)
MONOCYTES NFR BLD AUTO: 8.7 % (ref 0–13.4)
NEUTROPHILS # BLD AUTO: 3.91 K/UL (ref 1.82–7.42)
NEUTROPHILS NFR BLD: 62.8 % (ref 44–72)
NRBC # BLD AUTO: 0 K/UL
NRBC BLD-RTO: 0 /100 WBC
PLATELET # BLD AUTO: 361 K/UL (ref 164–446)
PMV BLD AUTO: 10 FL (ref 9–12.9)
POTASSIUM SERPL-SCNC: 3.9 MMOL/L (ref 3.6–5.5)
PROT SERPL-MCNC: 7.1 G/DL (ref 6–8.2)
RBC # BLD AUTO: 5.12 M/UL (ref 4.7–6.1)
SODIUM SERPL-SCNC: 140 MMOL/L (ref 135–145)
TROPONIN T SERPL-MCNC: 8 NG/L (ref 6–19)
WBC # BLD AUTO: 6.2 K/UL (ref 4.8–10.8)

## 2020-11-17 PROCEDURE — 71045 X-RAY EXAM CHEST 1 VIEW: CPT

## 2020-11-17 PROCEDURE — 84484 ASSAY OF TROPONIN QUANT: CPT

## 2020-11-17 PROCEDURE — A9270 NON-COVERED ITEM OR SERVICE: HCPCS | Performed by: EMERGENCY MEDICINE

## 2020-11-17 PROCEDURE — 85025 COMPLETE CBC W/AUTO DIFF WBC: CPT

## 2020-11-17 PROCEDURE — 83690 ASSAY OF LIPASE: CPT

## 2020-11-17 PROCEDURE — 700102 HCHG RX REV CODE 250 W/ 637 OVERRIDE(OP): Performed by: EMERGENCY MEDICINE

## 2020-11-17 PROCEDURE — 93005 ELECTROCARDIOGRAM TRACING: CPT | Performed by: EMERGENCY MEDICINE

## 2020-11-17 PROCEDURE — 80053 COMPREHEN METABOLIC PANEL: CPT

## 2020-11-17 PROCEDURE — 99284 EMERGENCY DEPT VISIT MOD MDM: CPT

## 2020-11-17 PROCEDURE — 93005 ELECTROCARDIOGRAM TRACING: CPT

## 2020-11-17 RX ORDER — FAMOTIDINE 20 MG/1
20 TABLET, FILM COATED ORAL ONCE
Status: COMPLETED | OUTPATIENT
Start: 2020-11-17 | End: 2020-11-17

## 2020-11-17 RX ORDER — FAMOTIDINE 20 MG/1
20 TABLET, FILM COATED ORAL DAILY
Qty: 30 TAB | Refills: 0 | Status: SHIPPED | OUTPATIENT
Start: 2020-11-17 | End: 2022-01-19

## 2020-11-17 RX ADMIN — FAMOTIDINE 20 MG: 20 TABLET, FILM COATED ORAL at 04:18

## 2020-11-17 RX ADMIN — LIDOCAINE HYDROCHLORIDE 30 ML: 20 SOLUTION OROPHARYNGEAL at 02:54

## 2020-11-17 SDOH — HEALTH STABILITY: MENTAL HEALTH: HOW MANY STANDARD DRINKS CONTAINING ALCOHOL DO YOU HAVE ON A TYPICAL DAY?: 1 OR 2

## 2020-11-17 SDOH — HEALTH STABILITY: MENTAL HEALTH: HOW OFTEN DO YOU HAVE A DRINK CONTAINING ALCOHOL?: 2-4 TIMES A MONTH

## 2020-11-17 ASSESSMENT — FIBROSIS 4 INDEX: FIB4 SCORE: 0.52

## 2020-11-17 NOTE — ED TRIAGE NOTES
Chao Loving  50 y.o. male  Chief Complaint   Patient presents with   • Chest Pain     x 2 days ago; substernal, occasionally on L side but otherwise nonradiating, worse with inspiration. Denies assoc SOB.       Triage completed with language line . Patient ambulatory to triage with a steady gait for above complaint. Positive COVID 10/8, hospitalized for COVID assoc PNA.     Patient is alert and oriented, speaking in full sentences, following commands, and responding appropriately to questions. Pt appears anxious. Educated on triage process and instructed patient to alert staff to any changes in condition or worsening symptoms.     /92   Pulse 89   Temp 36.6 °C (97.8 °F) (Temporal)   Resp 17   Wt 74.8 kg (164 lb 14.5 oz)   SpO2 97%   BMI 27.44 kg/m²

## 2020-11-17 NOTE — DISCHARGE INSTRUCTIONS
You were seen in the ED for chest pain. Your physical exam, EKG, chest X-ray and bloodwork evaluating your heart were performed. All of these tests were reassuring. It does not appear that you had a heart attack. The cause of your pain is unclear, however it does not appear to be dangerous at this time. At this time it is safe for you to go home.    I think your symptoms are from acid reflux.  You are being started on a medication to take to help prevent this.  You may also take Tums or Maalox.    Return to the ED if you develop chest pain, lightheadedness, shortness of breath, if your pain does not improve, or for any other new or concerning symptoms.  ================================  Coronavirus Information    You have been diagnosed with Covid-19,  Please isolate yourself at home until you you meet CDC criteria to end your isolation.      Please contact [facility] hotline (or your local health department)  or your healthcare provider before going to a medical facility:    [facility]  24hr hotline: [phone]      Information is available from the Centers for Disease Control and Prevention  www.CDC.gov    and     [facility]  [email or web address]    You will need to remain in home isolation until all 3 of the following are true:    1.  At least 10 days have passed since your first symptoms    2.  Your symptoms are improving    3.  You have not had a fever for at least 72 hours.    If you are severely ill or having a hard time breathing (cannot walk short distances), have an oxygen saturation that remains below 90%, or develop new concerning symptoms such as chest pain or abdominal pain please immediately seek medical care. Notify the  or Emergency Department Triage about your symptoms.

## 2020-11-17 NOTE — ED PROVIDER NOTES
ED Provider Note    Scribed for Hao Burgess M.D. by Nahid Foster. 11/17/2020,  2:36 AM.    Means of Arrival: Walk-in  History obtained from: Patient via [place]z Interpretor  History limited by: None    CHIEF COMPLAINT  Chief Complaint   Patient presents with   • Chest Pain     x 2 days ago; substernal, occasionally on L side but otherwise nonradiating, worse with inspiration. Denies assoc SOB.       HPI  Chao Loving is a 50 y.o. male who presents to the Emergency Department for acute, intermittent chest pain onset 2 days ago. Patient states that 1 month ago he tested positive for COVID-19 and experienced chest pain then similar to his current pain. He describes the pain as burning in quality, and located to the center of his chest and his the left side. Deep inspirations exacerbate his pain. He reports feeling short of breath, but denies any leg swelling or pain.    REVIEW OF SYSTEMS  CARDIOVASCULAR:  Chest pain  RESPIRATORY:  Shortness of breath  MUSCULOSKELETAL:  No leg pain or swelling.   See HPI for further details.   All other systems are negative.     PAST MEDICAL HISTORY  History reviewed. No pertinent past medical history.    FAMILY HISTORY  History reviewed. No pertinent family history.    SOCIAL HISTORY   reports that he has never smoked. He does not have any smokeless tobacco history on file. He reports current alcohol use. He reports previous drug use.    SURGICAL HISTORY  History reviewed. No pertinent surgical history.    CURRENT MEDICATIONS  Home Medications     Reviewed by Cathy Cancino R.N. (Registered Nurse) on 11/17/20 at 0206  Med List Status: Partial   Medication Last Dose Status   Acetaminophen (TYLENOL EXTRA STRENGTH PO)  Active   dexamethasone (DECADRON) 4 MG Tab  Active                ALLERGIES  No Known Allergies    PHYSICAL EXAM  VITAL SIGNS: /92   Pulse 89   Temp 36.6 °C (97.8 °F) (Temporal)   Resp 17   Wt 74.8 kg (164 lb 14.5 oz)   SpO2 97%   BMI  27.44 kg/m²    Gen: Alert, no acute distress  HEENT: ATNC  Neck: trachea midline  Resp: CTA bilaterally, no respiratory distress  CV: RRR, no murmur, No JVD,   Abd: Epigastric tenderness to palpation. non-distended  Ext: No deformities, equal radial pulses, no pedal edema, no calf tenderness.   Psych: normal mood  Neuro: speech fluent        DIAGNOSTIC STUDIES / PROCEDURES     EKG  Results for orders placed or performed during the hospital encounter of 20   EKG   Result Value Ref Range    Report       [facility] Emergency Dept.    Test Date:  2020  Pt Name:    LÁZARO PALOMINO    Department: ER  MRN:        [id number]                      Room:  Gender:     Male                         Technician: [id number]  :        1970                   Requested By:ER TRIAGE PROTOCOL  Order #:    [id number]                    Reading MD: Hao Burgess    Measurements  Intervals                                Axis  Rate:       76                           P:          9  IL:         136                          QRS:        -37  QRSD:       86                           T:          8  QT:         388  QTc:        437    Interpretive Statements  SINUS RHYTHM  LEFT AXIS DEVIATION  EARLY PRECORDIAL R/S TRANSITION  LEFT VENTRICULAR HYPERTROPHY  Compared to ECG 10/08/2020 01:11:40  Left-axis deviation now present  T-wave abnormality no longer present  Electronically Signed On 2020 3:45:53 PST by Hao Burgess          LABS  Labs Reviewed   CBC WITH DIFFERENTIAL - Abnormal; Notable for the following components:       Result Value    MCHC 32.8 (*)     Lymphocytes 21.80 (*)     All other components within normal limits   COMP METABOLIC PANEL - Abnormal; Notable for the following components:    Glucose 111 (*)     All other components within normal limits   TROPONIN   LIPASE   ESTIMATED GFR     All labs reviewed by me.    RADIOLOGY  DX-CHEST-PORTABLE (1 VIEW)   Final Result         1.  Bilateral  basilar atelectasis, no focal infiltrate        The radiologist’s interpretation of all radiology studies have been reviewed by me.    COURSE & MEDICAL DECISION MAKING  Pertinent Labs & Imaging studies reviewed. (See chart for details)    2:36 AM Patient seen and examined at bedside. Bedside ultrasound showed no pericardial fluid, no D sign to suggest right heart overload from PE.. Patient will be treated with GI cocktail 30 mL for his symptoms.     3:43 AM Patient was reevaluated at bedside; his pain has resolved. Discussed lab and radiology results with the patient and informed them they are reassuring. The patient/parent(s) was given an opportunity to ask questions. Return precautions were discussed with the patient, and they were cleared for discharge at this time. Patient was understanding and agreeable to discharge.     Patient's Heart Score is 2.     Medical Decision Making:  The patient presented with symptoms concerning for cardiac chest pain. The EKG was not ischemic and the patient's initial workup was negative for MI, pneumothorax, heart failure.  Patient has had several days of symptoms, I do not believe repeat troponin is required in the circumstance.  No evidence of pancreatitis, or hepatitis.  Low suspicion for ascending cholangitis, cholecystitis, symptomatic cholelithiasis.  The patient's symptoms, labs and vital signs are not consistent with a pulmonary embolism. The chest x-ray and symptoms are not suggestive of an aortic dissection, pneumonia, spontaneous pneumothorax.  Patient is Covid positive, however has reassuring vital signs and oxygenation on room air.    Patient's symptoms resolved completely with GI cocktail, suggesting possible GERD with esophagitis.  He will be started on Pepcid for this.  He was given return precautions, to [facility] guidance, and the opportunity to ask questions prior to discharge via .    PPE Note: I verified that the patient was wearing a mask and I was  wearing appropriate PPE every time I entered the room. The patient's mask was on the patient at all times during my encounter except for a brief view of the oropharynx.     Scribe remained outside the patient's room and did not have any contact with the patient for the duration of patient encounter.     The patient will return for new or worsening symptoms and is stable at the time of discharge.    The patient is referred to a primary physician for blood pressure management, diabetic screening, and for all other preventative health concerns.    DISPOSITION:  Patient will be discharged home in stable condition.    FOLLOW UP:  [facility], Emergency Dept  [address] [address] [phone]    If symptoms worsen    Your regular doctor.  To establish a primary care provider within our system, please call [phone]            OUTPATIENT MEDICATIONS:  New Prescriptions    FAMOTIDINE (PEPCID) 20 MG TAB    Take 1 Tab by mouth every day.       FINAL IMPRESSION  1. Chest pain, unspecified type    2. Gastroesophageal reflux disease with esophagitis without hemorrhage    3. COVID-19            Nahid RODRIGUEZ (Delma), am scribing for, and in the presence of, Hao Burgess M.D..    Electronically signed by: Nahid Foster (Delma), 11/17/2020    Hao RODRIGUEZ M.D. personally performed the services described in this documentation, as scribed by Nahid Foster in my presence, and it is both accurate and complete. C.    The note accurately reflects work and decisions made by me.  Hao Burgess M.D.  11/17/2020  4:18 AM

## 2021-11-20 NOTE — DISCHARGE INSTRUCTIONS
Discharge Instructions    Discharged to home by car with relative. Discharged via wheelchair, hospital escort: Yes.  Special equipment needed: Oxygen    Be sure to schedule a follow-up appointment with your primary care doctor or any specialists as instructed.     Discharge Plan:     Continue to use your oxygen until your primary care provider tells you that you no longer need oxygen.    Diet Plan: Discussed  Activity Level: Discussed  Confirmed Follow up Appointment: Patient to Call and Schedule Appointment  Confirmed Symptoms Management: Discussed  Medication Reconciliation Updated: Yes  Influenza Vaccine Indication: Indicated: 9 to 64 years of age  Influenza Vaccine Given - only chart on this line when given: Influenza Vaccine Given (See MAR)    I understand that a diet low in cholesterol, fat, and sodium is recommended for good health. Unless I have been given specific instructions below for another diet, I accept this instruction as my diet prescription.   Other diet: Regular    Special Instructions: None    · Is patient discharged on Warfarin / Coumadin?   No     COVID-19: Cómo protegerse y proteger a los demás  COVID-19: How to Protect Yourself and Others  Sepa cómo se propaga  · Actualmente, no existe ninguna vacuna para prevenir la enfermedad por coronavirus 2019 (COVID-19).  · La mejor forma de prevenir la enfermedad es evitar exponerse a charles virus.  · Se zeynep que el virus se transmite principalmente de maria g persona a otra.  ? Entre las personas que están en contacto directo entre sí (a maria g distancia inferior a 6 pies [1.80 m]).  ? A través de las gotitas respiratorias producidas cuando maria g persona infectada tose, estornuda o habla.  ? Estas gotitas pueden caer en la boca o en la nariz de las personas que están cerca o pueden ser inhaladas hacia los pulmones.  ? Algunos estudios recientes sugieren que la COVID-19 puede ser transmitida por personas que no presentan síntomas.  Lo que todos deben  hacer  Límpiese las markus con frecuencia  · Lávese las markus con frecuencia con agua y jabón parrish al menos 20 segundos, especialmente después de emerson estado en un lugar público o después de sonarse la nariz, toser o estornudar.  · Si no dispone de agua y jabón, use un desinfectante de markus que contenga al menos un 60 % de alcohol. Cubra todas las superficies de las markus y frótelas hasta que se sientan secas.  · No se toque los ojos, la nariz y la boca sin antes lavarse las markus.  Evite el contacto cercano  · Quédese en casa si está enfermo.  · Evite el contacto cercano con personas que estén enfermas.  · Establezca distancia entre usted y otras personas.  ? Recuerde que algunas personas que no tienen síntomas pueden transmitir el virus.  ? [place] es especialmente importante para las personas que tienen más riesgo de enfermarse.www.cdc.gov/coronavirus/2019-ncov/need-extra-precautions/people-at-higher-risk.html  Cúbrase la boca y la nariz con un barbijo de jesus cuando esté cerca de otras personas  · Puede transmitir la COVID-19 a otras personas aunque no se sienta enfermo.  · Todas las personas deben usar un barbijo de jesus cuando tengan que ir a un lugar público, por ejemplo, al supermercado o a buscar otros productos necesarios.  ? Los barbijos de jesus no deben colocarse a niños menores de 2 años de edad, a las personas que tienen problemas respiratorios o que estén inconscientes, incapacitadas o que por algún motivo no puedan quitarse la mascarilla sin ayuda.  · El propósito del barbijo de jesus es proteger a otras personas en ely de que usted esté infectado.  · NO utilice las mascarillas destinadas a los trabajadores de la anderson.  · Continúe manteniendo maria g distancia aproximada de 6 pies (1.80 m) entre usted y otras personas. El barbijo de jesus no reemplaza el distanciamiento social.  Cúbrase al toser y estornudar  · Si está en un ambiente privado y no tiene el barbijo de jesus, recuerde siempre cubrirse la  boca y la nariz con un pañuelo descartable al toser o estornudar, o usar el pliegue del codo.  · Deseche los pañuelos descartables usados en la basura.  · Inmediatamente, lávese las markus con agua y jabón parrish al menos 20 segundos. Si no dispone de agua y jabón, límpiese las markus con un desinfectante de markus que contenga al menos un 60 % de alcohol.  Limpie y desinfecte  · Limpie Y desinfecte las superficies que se tocan con frecuencia todos los días. [place] incluye mesas, picaportes, interruptores de joseph, encimeras, mangos, escritorios, teléfonos, teclados, inodoros, grifos y lavabos. www.cdc.gov/coronavirus/2019-ncov/prevent-getting-sick/disinfecting-your-home.html  · Si las superficies están sucias, límpielas: Use detergente o jabón y agua antes de la desinfección.  · Luego, use un desinfectante doméstico. Puede consultar maria g lista de los desinfectantes domésticos registrados en la Environmental Protection Agency (EPA) (Agencia de Protección Ambiental) aquí.  cdc.gov/coronavirus  05/05/2020  Esta información no tiene nette fin reemplazar el consejo del médico. Asegúrese de hacerle al médico cualquier pregunta que tenga.  Document Released: 04/23/2020 Document Revised: 05/19/2020 Document Reviewed: 04/14/2020  Elsevier Patient Education © 2020 Elsevier Inc.    Preguntas frecuentes sobre el COVID-19  COVID-19 Frequently Asked Questions  El COVID-19 (enfermedad por coronavirus) es maria g infección causada por maria g gran antonia de virus. Algunos virus causan enfermedades en las personas y otros causan enfermedades en animales tales nette los camellos, los gatos y los murciélagos. En algunos casos, los virus que causan enfermedades en los animales pueden transmitirse a los seres humanos.  ¿De dónde provino el coronavirus?  En diciembre de 2019, [place] le informó a la Organización Mundial de [employer] (OMS) acerca de varios casos de enfermedad pulmonar (enfermedad respiratoria humana). Estos casos estaban vinculados con un  kaur abierto de rakan de mar y ganado en la ciudad de [place]. El vínculo con el kaur de ganado y mariscos sugiere que el virus puede haberse propagado de los animales a los humanos. Sin embargo, desde jose ramon primer brote en diciembre, también se ha demostrado que el virus se contagia de maria g persona a otra.  ¿Cuál es el nombre de la enfermedad y del virus?  Nombre de la enfermedad  Al principio, esta enfermedad se llamó nuevo coronavirus. Hamer se debe a que los científicos determinaron que la enfermedad era causada por un nuevo virus respiratorio. La Organ[employer] (OMS) ahora ha dado a la enfermedad el nombre de COVID-19, o enfermedad por coronavirus.  Nombre del virus  El virus causante de la enfermedad se conoce nette coronavirus de tipo 2 causante del síndrome respiratorio michael grave (SARS-CoV-2).  Más información sobre el nombre de la enfermedad y el virus  [employer] (OMS): www.who.int/emergencies/diseases/novel-coronavirus-2019/technical-guidance/naming-the-coronavirus-disease-(covid-2019)-and-the-virus-that-causes-it  ¿Quiénes están en riesgo de sufrir complicaciones debido a la enfermedad por coronavirus?  Algunas personas pueden tener un riesgo más alto de tener complicaciones debido a la enfermedad por coronavirus. Entre ellas se encuentran los adultos mayores y las personas que tienen enfermedades crónicas, nette enfermedad cardíaca, diabetes y enfermedad pulmonar.  Si tiene un riesgo más alto de tener complicaciones, tome estas precauciones adicionales:  · Evitar el contacto cercano con personas que estén enfermas o que tengan fiebre o tos. Permanecer al menos a maria g distancia de 3 a 6 pies (1-2 m) de las otras personas, si es posible.  · Lavarse las markus regularmente con agua y jabón parrish al menos 20 segundos.  · Evitar tocarse la carlos, la boca, la nariz y los ojos.  · Tener a mano los suministros en bloom casa, nette alimentos, medicamentos y productos de  limpieza.  · Permanecer en bloom casa todo lo que sea posible.  · Evitar las reuniones sociales y los viajes.  ¿Cómo se transmite la enfermedad causada por el coronavirus?  El virus que causa la enfermedad por coronavirus se transmite fácilmente de maria g persona a otra (es contagioso). También hay casos de enfermedad de transmisión comunitaria. [place] significa que la enfermedad se ha propagado a:  · Personas que no tienen contacto conocido con otras personas infectadas.  · Personas que no arellano viajado a zonas donde hay casos conocidos.  Aparentemente, se transmite de maria g persona a otra a través de las gotitas que se despiden al toser o al estornudar.  ¿Puedo contraer al virus al tocar superficies u objetos?  Todavía hay mucho que no se conoce acerca del virus que causa la enfermedad por coronavirus. Los científicos basan gran parte de la información en lo que saben sobre virus similares, por ejemplo:  · En general, los virus no sobreviven en superficies parrish mucho tiempo. Necesitan un cuerpo humano (huésped) para sobrevivir.  · Es más probable que el virus se contagie por contacto cercano con personas que están enfermas (contacto directo), por ejemplo:  ? Al estrechar las markus o abrazarse.  ? Al inhalar las gotitas respiratorias que se desplazan por el aire. [place] puede ocurrir cuando maria g persona infectada tose o estornuda sobre o cerca de otras personas.  · Es menos probable que el virus se propague cuando maria g persona toca maria g superficie o un objeto sobre el que está el virus (contacto indirecto). El virus puede ingresar al cuerpo si la persona toca maria g superficie o un objeto y luego se toca la carlos, los ojos, la nariz o la boca.  ¿Maria G persona puede contagiar el virus sin tener síntomas de la enfermedad?  Puede ser posible que el virus se contagie antes de que la persona tenga síntomas de la enfermedad, jason muy probablemente esta no sea la principal forma en que el virus se está propagando. Es más probable que el  virus se propague al estar en contacto directo con personas que están enfermas e inhalar las gotas respiratorias que maria g persona enferma despide al toser o estornudar.  ¿Cuáles son los síntomas de la enfermedad causada por el coronavirus?  Los síntomas varían de maria g persona a otra y pueden variar de leves a graves. Entre los síntomas, se pueden incluir los siguientes:  · Fiebre.  · Tos.  · Cansancio, debilidad o fatiga.  · Respiración rápida o sensación de falta el aire.  Estos síntomas pueden aparecer en el término de 2 a 14 días después de emerson estado expuesto al virus. Si presenta síntomas, llame al médico. Las personas con síntomas graves pueden necesitar atención hospitalaria.  Si estoy expuesto al virus, ¿cuánto tiempo tardan en aparecer los síntomas?  Los síntomas de la enfermedad por coronavirus pueden aparecer en cualquier momento en el término de 2 a 14 días después de que maria g persona haya estado expuesta al virus. Si presenta síntomas, llame al médico.  ¿Nikia hacerme un análisis de detección del virus?  El médico decidirá si debe realizarse un análisis en función de doris síntomas, antecedentes de exposición y factores de riesgo.  ¿Cómo realiza el médico el análisis para detectar charles virus?  Los médicos obtienen muestras para enviar a analizar. Estas muestras pueden incluir lo siguiente:  · Oswaldo con un hisopo líquido de la nariz.  · Pedirle que tosa mucosidad (esputo) para extraer líquido de los pulmones en un recipiente estéril.  · Oswaldo maria g muestra de mary.  · Oswaldo maria g muestra de heces u orina.  ¿Hay algún tratamiento o vacuna para charles virus?  Actualmente, no existe ninguna vacuna para prevenir la enfermedad por coronavirus. Además, no existen medicamentos nette los antibióticos o los antivirales para tratar el virus. Maria G persona que se enferma recibe tratamiento de apoyo, lo que significa reposo y líquidos. Maria G persona también puede aliviar doris síntomas con medicamentos de venta moraima para tratar  los estornudos, la tos y el goteo nasal. Son los mismos medicamentos que se mark para el resfrío común.  Si presenta síntomas, llame al médico. Las personas con síntomas graves pueden necesitar atención hospitalaria.  ¿Qué puedo hacer para protegerme y proteger a mi antonia de charles virus?         Puede protegerse y proteger a bloom antonia tomando las mismas medidas que tomaría para prevenir el contagio de otros virus. [place] las siguientes medidas:  · Lavarse las markus regularmente con agua y jabón parrish al menos 20 segundos. Usar desinfectante para markus con alcohol si no dispone de agua y jabón.  · Evitar tocarse la carlos, la boca, la nariz y los ojos.  · Toser o estornudar en un pañuelo descartable, sobre bloom manga o codo. No toser o estornudar al aire ni cubrirse con la mano.  ? Si tose o estornuda en un pañuelo de papel, deséchelo inmediatamente y lávese las markus.  · Desinfectar los objetos y las superficies que se tocan con frecuencia todos los días.  · Evitar el contacto cercano con personas que estén enfermas o que tengan fiebre o tos. Permanecer al menos a maria g distancia de 3 a 6 pies (1-2 m) de las otras personas, si es posible.  · Quedarse en bloom casa si está enfermo, excepto para obtener atención médica. Llame al médico antes de buscar atención médica.  · Asegúrese de tener las vacunas al día. Pregúntele al médico qué vacunas necesita.  ¿Qué bianca hacer si tengo que viajar?  Siga las recomendaciones relacionadas con los viajes de la autoridad de anderson local, los CDC y la OMS.  Información y consejos para viajeros  · Centers for Disease Control and Prevention (CDC) (Centros para el Control y la Prevención de Enfermedades): www.cdc.gov/coronavirus/2019-ncov/travelers/index.html  · Organización [employer] [place] (OMS): www.who.int/emergencies/diseases/novel-coronavirus-2019/travel-advice  Conozca los riesgos y tome medidas para proteger bloom anderson  · El riesgo de contraer la enfermedad por coronavirus es más  alto si viaja a zonas con un brote o si está en contacto con viajeros que provienen de zonas donde hay un brote.  · Lávese las markus con frecuencia y mantenga maria g higiene adecuada para reducir el riesgo de contagiarse o transmitir el virus.  ¿Qué bianca hacer si estoy enfermo?  Instrucciones generales para detener la propagación de la infección  · Lavarse las markus regularmente con agua y jabón parrish al menos 20 segundos. Usar desinfectante para markus con alcohol si no dispone de agua y jabón.  · Toser o estornudar en un pañuelo descartable, sobre bloom manga o codo. No toser o estornudar al aire ni cubrirse con la mano.  · Si tose o estornuda en un pañuelo de papel, deséchelo inmediatamente y lávese las markus.  · Quédese en bloom casa a menos que deba recibir atención médica. Llame al médico o a la autoridad de anderson local antes de buscar atención médica.  · Evite las zonas públicas. No viaje en transporte público, de ser posible.  · Si puede, use un barbijo si debe salir de la casa o si está en contacto cercano con alguien que no está enfermo.  Mantenga bloom casa limpia  · Desinfecte los objetos y las superficies que se tocan con frecuencia todos los días. Pueden incluir:  ? Encimeras y mesas.  ? Picaportes e interruptores de joseph.  ? Lavabos, fregaderos y grifos.  ? Aparatos electrónicos tales nette teléfonos, controles remotos, teclados, computadoras y tabletas.  · Lave los platos con agua jabonosa caliente o en el lavavajillas. Deje los platos para que se sequen al aire.  · Lave la ropa con [place].  Evite infectar a otros miembros de la antonia  · Permita que los miembros de la antonia sanos cuiden a los niños y las mascotas, si es posible. Si tiene que cuidar a los niños o las mascotas, lávese las markus con frecuencia y use un barbijo.  · Duerma en maria g habitación o cama diferentes, si es posible.  · No comparta elementos personales, nette afeitadoras, cepillos de dientes, desodorantes, peines, cepillos, toallas y  toallitas de mano.  Dónde buscar más información  Centers for Disease Control and Prevention (CDC)  · Actualizaciones de información y novedades: www.cdc.gov/coronavirus/2019-ncov  [employer] [place] (OMS)  · Actualizaciones de información y novedades: www.who.int/emergencies/diseases/novel-coronavirus-2019  · Debi de anderson relacionado con el coronavirus: www.who.int/health-topics/coronavirus  · Preguntas y respuestas sobre COVID-19: www.who.int/news-room/q-a-detail/i-s-qnbyhdhersjqz  · Registro mundial: who.chelo.com  American Academy of Pediatrics (AAP) (Academia Estadounidense de Pediatría)  · Información para familias: www.healthychildren.org/English/health-issues/conditions/chest-lungs/Pages/2019-Novel-Coronavirus.aspx  La situación del coronavirus cambia rápidamente. Consulte el sitio web de bloom autoridad de anderson local o los sitios web de los CDC y la OMS para enterarse de las novedades y noticias.  ¿Cuándo bianca comunicarme con un médico?  · Comuníquese con bloom médico si tiene síntomas de infección, nette fiebre o tos, y:  ? Ha estado cerca de alguien que sabe que tiene la enfermedad por coronavirus.  ? Ha estado en contacto con maria g persona que presuntamente sufra de la enfermedad por coronavirus.  ? Ha viajado fuera del país.  ¿Cuándo bianca buscar asistencia médica inmediata?  · Busque ayuda de inmediato llamando al servicio de emergencias de bloom localidad (911 en los [place]) si tiene lo siguiente:  ? Dificultad para respirar.  ? Dolor u opresión en el pecho.  ? Confusión.  ? Labios y uñas de color azulado.  ? Dificultad para despertarse.  ? Síntomas que empeoran.  Informe al personal médico de emergencias si zeynep que tiene la enfermedad por coronavirus.  Resumen  · Un nuevo virus respiratorio se propaga de maria g persona a otra y causa COVID-19 (enfermedad por coronavirus).  · El virus que causa el COVID-19 parece diseminarse fácilmente. Se transmite de maria g persona a otra a través de las  gotitas que se despiden al toser o al estornudar.  · Los adultos mayores y las personas que tienen enfermedades crónicas tienen mayor riesgo de contraer la enfermedad. Si tiene un riesgo más alto de tener complicaciones, tome precauciones adicionales.  · Actualmente, no existe ninguna vacuna para prevenir la enfermedad por coronavirus. No existen medicamentos, nette los antibióticos o los antivirales, para tratar el virus.  · Puede protegerse y proteger a bloom antonia al lavarse las markus con frecuencia, evitar tocarse la carlos y cubrirse al toser y estornudar.  Esta información no tiene nette fin reemplazar el consejo del médico. Asegúrese de hacerle al médico cualquier pregunta que tenga.  Document Released: 04/27/2020 Document Revised: 04/27/2020 Document Reviewed: 04/27/2020  Elsevier Patient Education © 2020 [employer]    COVID-19  COVID-19  La COVID-19 es maria g infección respiratoria causada por un virus llamado coronavirus tipo 2 causante del síndrome respiratorio michael grave (SARS-CoV-2). La enfermedad también se conoce nette enfermedad por coronavirus o nuevo coronavirus. En algunas personas, el virus puede no ocasionar síntomas. En otras, puede producir maria g infección grave. La infección puede empeorar rápidamente y causar complicaciones, nette:  · Neumonía o infección en los pulmones.  · Síndrome de dificultad respiratoria aguda o SDRA. Se trata de la acumulación de líquido en los pulmones.  · Insuficiencia respiratoria aguda. Se trata de maria g afección en la que no pasa suficiente oxígeno de los pulmones al cuerpo.  · Sepsis o choque séptico. Se trata de maria g reacción grave del cuerpo ante maria g infección.  · Problemas de coagulación.  · Infecciones secundarias debido a bacterias u hongos.  El virus que causa la COVID-19 es contagioso. [place] significa que puede transmitirse de maria g persona a otra a través de las gotitas de saliva de la tos y de los estornudos (secreciones respiratorias).  ¿Cuáles son las causas?  Esta  enfermedad es causada por un virus. Usted puede contagiarse con charles virus:  · Al aspirar las gotitas que maria g persona infectada elimina al toser o estornudar.  · Al tocar algo, nette maria g modi o el picaportes de maria g shankar, que estuvo expuesto al virus (contaminado) y luego tocarse la boca, nariz o los ojos.  ¿Qué incrementa el riesgo?  Riesgo de infección  Es más probable que se infecte con charles virus si:  · Vive o viaja a maria g stefania donde hay un brote de COVID-19.  · Entra en contacto con maria g persona enferma que recientemente viajó a maria g stefania con un brote de COVID-19.  · Cuida o vive con maria g persona infectada con COVID-19.  Riesgo de enfermedad grave  Es más probable que se enferme gravemente por el virus si:  · Tiene 65 años o más.  · Tiene maria g enfermedad crónica que disminuye la capacidad del cuerpo para combatir las infecciones (immunocomprometido).  · Vive en un hogar de ancianos o centro de atención a melinda plazo.  · Tiene maria g enfermedad prolongada (crónica), nette las siguientes:  ? Enfermedad pulmonar crónica, que incluye la enfermedad pulmonar obstructiva crónica o asma.  ? Enfermedad cardíaca.  ? Diabetes.  ? Enfermedad renal crónica.  ? Enfermedad hepática.  · Es chayo.  ¿Cuáles son los signos o síntomas?  Los síntomas de esta afección pueden ser de leves a graves. Los síntomas pueden aparecer en el término de 2 a 14 días después de emerson estado expuesto al virus. Incluyen los siguientes:  · Fiebre.  · Tos.  · Dificultad para respirar.  · Escalofríos.  · Yohana musculares.  · Dolor de garganta.  · Pérdida del gusto o el olfato.  Algunas personas también pueden tener problemas estomacales, nette náuseas, vómitos o diarrea.  Es posible que otras personas no tengan síntomas de COVID-19.  ¿Cómo se diagnostica?  Esta afección se puede diagnosticar en función de lo siguiente:  · Gretta signos y síntomas, especialmente si:  ? Vive en maria g stefania donde hay un brote de COVID-19.  ? Viajó recientemente a maria g stefania donde el  virus es frecuente.  ? Cuida o vive con maria g persona a quien se le diagnosticó COVID-19.  · Un examen físico.  · Análisis de laboratorio que pueden incluir:  ? Un hisopado nasal para melissa maria g muestra de líquido de la nariz.  ? Un hisopado de garganta para melissa maria g muestra de líquido de la garganta.  ? Maria G muestra de mucosidad de los pulmones (esputo).  ? Análisis de mary.  · Los estudios de diagnóstico por imágenes pueden incluir radiografías, exploración por tomografía computarizada (TC) o ecografía.  ¿Cómo se trata?  En charles momento, no hay ningún medicamento para tratar la COVID-19. Los medicamentos para tratar otras enfermedades se usan a modo de ensayo para comprobar si son eficaces contra la COVID-19.  El médico le informará sobre las maneras de tratar los síntomas. En la mayoría de las personas, la infección es leve y puede controlarse en el hogar con reposo, líquidos y medicamentos de venta moraima.  El tratamiento para maria g infección grave suele realizarse en la unidad de cuidados intensivos (UCI) de un hospital. Puede incluir jessica o más de los siguientes. Estos tratamientos se administran hasta que los síntomas mejoran.  · Recibir líquidos y medicamentos a través de maria g vía intravenosa.  · Oxígeno complementario. Para administrar oxígeno extra, se utiliza un tubo en la nariz, maria g mascarilla o maria g shoshana de oxígeno.  · Colocarlo para que se recueste boca abajo (decúbito prono). [place] facilita el ingreso de oxígeno a los pulmones.  · Uso continuo de maria g máquina de presión positiva de las vías aéreas (CPAP) o de presión positiva de las vías aéreas de dos niveles (BPAP). [place] tratamiento utiliza maria g presión de aire leve para mantener las vías respiratorias abiertas. Un tubo conectado a un motor administra oxígeno al cuerpo.  · Respirador. Charles tratamiento mueve el aire dentro y fuera de los pulmones mediante el uso de un tubo que se coloca en la tráquea.  · Traqueostomía. En charles procedimiento se hace un  orificio en el alexander para insertar un tubo de respiración.  · Oxigenación por membrana extracorpórea (OMEC). En charles procedimiento, los pulmones tienen la posibilidad de recuperarse al asumir las funciones del corazón y los pulmones. Suministra oxígeno al cuerpo y elimina el dióxido de carbono.  Siga estas instrucciones en bloom casa:  Estilo de nancy  · Si está enfermo, quédese en bloom casa, excepto para obtener atención médica. El médico le indicará cuánto tiempo debe quedarse en casa. Llame al médico antes de buscar atención médica.  · Mart reposo en bloom casa nette se lo haya indicado el médico.  · No consuma ningún producto que contenga nicotina o tabaco, nette cigarrillos, cigarrillos electrónicos y tabaco de mascar. Si necesita ayuda para dejar de fumar, consulte al médico.  · Retome doris actividades normales nette se lo haya indicado el médico. Pregúntele al médico qué actividades son seguras para usted.  Instrucciones generales  · Use los medicamentos de venta moraima y los recetados solamente nette se lo haya indicado el médico.  · Lizzie suficiente líquido nette para mantener la orina de color amarillo pálido.  · Concurra a todas las visitas de seguimiento nette se lo haya indicado el médico. [place] es importante.  ¿Cómo se sha?    No hay ninguna vacuna que ayude a prevenir la infección por la COVID-19. Sin embargo, hay medidas que puede melissa para protegerse y proteger a otras personas de charles virus.  Para protegerse:   · No viaje a zonas donde la COVID-19 sea un riesgo. Las zonas donde se informa la presencia de la COVID-19 cambian con frecuencia. Para identificar las zonas de alto riesgo y las restricciones de viaje, consulte el sitio web de viajes de los Centers for Disease Control and Prevention (CDC) (Centros para el Control y la Prevención de Enfermedades): wwwnc.cdc.gov/travel/notices  · Si vive o debe viajar a maria g stefania donde COVID-19 es un riesgo, tome precauciones para evitar infecciones.  ? Aléjese de las  personas enfermas.  ? Lávese las markus frecuentemente con agua y jabón parrish 20 segundos. Use desinfectante para markus con alcohol si no dispone de agua y jabón.  ? Evite tocarse la boca, la carlos, los ojos o la nariz.  ? Evite salir de bloom casa, siga las indicaciones de bloom estado y de las autoridades sanitarias locales.  ? Si debe salir de bloom casa, use un barbijo de jesus o maria g mascarilla facial.  ? Desinfecte los objetos y las superficies que se tocan con frecuencia todos los días. Pueden incluir:  § Encimeras y mesas.  § Picaportes e interruptores de joseph.  § Lavabos, fregaderos y grifos.  § Aparatos electrónicos tales nette teléfonos, controles remotos, teclados, computadoras y tabletas.  Cómo proteger a los demás:  Si tiene síntomas de la COVID-19, tome medidas para evitar que el virus se propague a otras personas.  · Si zeynep que tiene maria g infección por la COVID-19, comuníquese de inmediato con blomo médico. Informe al equipo de atención médica que zeynep que puede tener maria g infección por la COVID-19.  · Quédese en bloom casa. Salga de bloom casa solo para buscar atención médica. No utilice el transporte público.  · No viaje mientras esté enfermo.  · Lávese las markus frecuentemente con agua y jabón parrish 20 segundos. Usar desinfectante para markus con alcohol si no dispone de agua y jabón.  · Manténgase alejado de quienes vivan con usted. Permita que los miembros de la antonia sanos cuiden a los niños y las mascotas, si es posible. Si tiene que cuidar a los niños o las mascotas, lávese las markus con frecuencia y use un barbijo. Si es posible, permanezca en bloom habitación, separado de los demás. Utilice un baño diferente.  · Asegúrese de que todas las personas que viven en bloom casa se laven mo las markus y con frecuencia.  · Tosa o estornude en un pañuelo de papel o sobre bloom manga o codo. No tosa o estornude al aire ni se cubra la boca o la nariz con la mano.  · Use un barbijo de jesus o maria g mascarilla facial.  Dónde buscar  más información  · Centers for Disease Control and Prevention (Centros para el Control y la Prevención de Enfermedades): www.cdc.gov/coronavirus/2019-ncov/index.html  · World Health Organization (Organización Mundial de la Trang): www.who.int/health-topics/coronavirus  Comuníquese con un médico si:  · Vive o ha viajado a maria g stefania donde la COVID-19 es un riesgo y tiene síntomas de infección.  · Ha tenido contacto con alguien que tiene COVID-19 y usted tiene síntomas de infección.  Solicite ayuda de inmediato si:  · Tiene dificultad para respirar.  · Siente dolor u opresión en el pecho.  · Experimenta confusión.  · Tiene las uñas de los dedos y los labios de color azulado.  · Tiene dificultad para despertarse.  · Los síntomas empeoran.  Estos síntomas pueden representar un problema grave que constituye maria g emergencia. No espere a june si los síntomas desaparecen. Solicite atención médica de inmediato. Comuníquese con el servicio de emergencias de bloom localidad (911 en los [place]). No conduzca por doris propios medios hasta el hospital. Informe al personal médico de emergencias si zeynep que tiene COVID-19.  Resumen  · La COVID-19 es maria g infección respiratoria causada por un virus. También se conoce nette enfermedad por coronavirus o nuevo coronavirus. Puede causar infecciones graves, nette neumonía, síndrome de dificultad respiratoria aguda, insuficiencia respiratoria aguda o sepsis.  · El virus que causa la COVID-19 es contagioso. [place] significa que puede transmitirse de maria g persona a otra a través de las gotitas de saliva de la tos y de los estornudos.  · Es más probable que desarrolle maria g enfermedad grave si tiene 65 años o más, tiene un sistema inmunitario débil, vive en un hogar de ancianos o tiene enfermedad crónica.  · No hay ningún medicamento para tratar la COVID-19. El médico le informará sobre las maneras de tratar los síntomas.  · Chalmette medidas para protegerse y proteger a los demás contra las infecciones.  Lávese las markus con frecuencia y desinfecte los objetos y las superficies que se tocan con frecuencia todos los días. Manténgase alejado de las personas que estén enfermas y use un barbijo si está enfermo.  Esta información no tiene nette fin reemplazar el consejo del médico. Asegúrese de hacerle al médico cualquier pregunta que tenga.  Document Released: 02/15/2020 Document Revised: 05/19/2020 Document Reviewed: 02/15/2020  Elsevier Patient Education © 2020 [employer]      Depression / Suicide Risk    As you are discharged from this [facility] facility, it is important to learn how to keep safe from harming yourself.    Recognize the warning signs:  · Abrupt changes in personality, positive or negative- including increase in energy   · Giving away possessions  · Change in eating patterns- significant weight changes-  positive or negative  · Change in sleeping patterns- unable to sleep or sleeping all the time   · Unwillingness or inability to communicate  · Depression  · Unusual sadness, discouragement and loneliness  · Talk of wanting to die  · Neglect of personal appearance   · Rebelliousness- reckless behavior  · Withdrawal from people/activities they love  · Confusion- inability to concentrate     If you or a loved one observes any of these behaviors or has concerns about self-harm, here's what you can do:  · Talk about it- your feelings and reasons for harming yourself  · Remove any means that you might use to hurt yourself (examples: pills, rope, extension cords, firearm)  · Get professional help from the community (Mental Health, Substance Abuse, psychological counseling)  · Do not be alone:Call your Safe Contact- someone whom you trust who will be there for you.  · Call your local CRISIS HOTLINE 527-6066 or 144-996-4526  · Call your local Children's Mobile Crisis Response Team [place] (433) 408-6960 or www.WeHealth  · Call the toll free National Suicide Prevention Hotlines   · National  Suicide Prevention Lifeline 268-357-FSSJ (7609)  · National [place] Line Network 800-SUICIDE (613-1555)       un domiciled/multiple ED visits

## 2022-01-15 ENCOUNTER — HOSPITAL ENCOUNTER (EMERGENCY)
Facility: MEDICAL CENTER | Age: 52
End: 2022-01-15
Attending: EMERGENCY MEDICINE
Payer: COMMERCIAL

## 2022-01-15 ENCOUNTER — APPOINTMENT (OUTPATIENT)
Dept: RADIOLOGY | Facility: MEDICAL CENTER | Age: 52
End: 2022-01-15
Attending: EMERGENCY MEDICINE
Payer: COMMERCIAL

## 2022-01-15 VITALS
BODY MASS INDEX: 27.88 KG/M2 | OXYGEN SATURATION: 95 % | RESPIRATION RATE: 18 BRPM | HEIGHT: 65 IN | HEART RATE: 54 BPM | SYSTOLIC BLOOD PRESSURE: 132 MMHG | TEMPERATURE: 97 F | DIASTOLIC BLOOD PRESSURE: 84 MMHG | WEIGHT: 167.33 LBS

## 2022-01-15 DIAGNOSIS — R07.9 CHEST PAIN, UNSPECIFIED TYPE: ICD-10-CM

## 2022-01-15 LAB
ALBUMIN SERPL BCP-MCNC: 4.7 G/DL (ref 3.2–4.9)
ALBUMIN/GLOB SERPL: 1.8 G/DL
ALP SERPL-CCNC: 83 U/L (ref 30–99)
ALT SERPL-CCNC: 27 U/L (ref 2–50)
ANION GAP SERPL CALC-SCNC: 15 MMOL/L (ref 7–16)
AST SERPL-CCNC: 18 U/L (ref 12–45)
BASOPHILS # BLD AUTO: 0.7 % (ref 0–1.8)
BASOPHILS # BLD: 0.04 K/UL (ref 0–0.12)
BILIRUB SERPL-MCNC: 0.2 MG/DL (ref 0.1–1.5)
BUN SERPL-MCNC: 17 MG/DL (ref 8–22)
CALCIUM SERPL-MCNC: 9.1 MG/DL (ref 8.5–10.5)
CHLORIDE SERPL-SCNC: 108 MMOL/L (ref 96–112)
CO2 SERPL-SCNC: 17 MMOL/L (ref 20–33)
CREAT SERPL-MCNC: 0.85 MG/DL (ref 0.5–1.4)
EKG IMPRESSION: NORMAL
EOSINOPHIL # BLD AUTO: 0.27 K/UL (ref 0–0.51)
EOSINOPHIL NFR BLD: 4.5 % (ref 0–6.9)
ERYTHROCYTE [DISTWIDTH] IN BLOOD BY AUTOMATED COUNT: 41.1 FL (ref 35.9–50)
GLOBULIN SER CALC-MCNC: 2.6 G/DL (ref 1.9–3.5)
GLUCOSE SERPL-MCNC: 121 MG/DL (ref 65–99)
HCT VFR BLD AUTO: 46.9 % (ref 42–52)
HGB BLD-MCNC: 15.6 G/DL (ref 14–18)
IMM GRANULOCYTES # BLD AUTO: 0.02 K/UL (ref 0–0.11)
IMM GRANULOCYTES NFR BLD AUTO: 0.3 % (ref 0–0.9)
LYMPHOCYTES # BLD AUTO: 1.72 K/UL (ref 1–4.8)
LYMPHOCYTES NFR BLD: 28.8 % (ref 22–41)
MCH RBC QN AUTO: 29.3 PG (ref 27–33)
MCHC RBC AUTO-ENTMCNC: 33.3 G/DL (ref 33.7–35.3)
MCV RBC AUTO: 88 FL (ref 81.4–97.8)
MONOCYTES # BLD AUTO: 0.51 K/UL (ref 0–0.85)
MONOCYTES NFR BLD AUTO: 8.5 % (ref 0–13.4)
NEUTROPHILS # BLD AUTO: 3.41 K/UL (ref 1.82–7.42)
NEUTROPHILS NFR BLD: 57.2 % (ref 44–72)
NRBC # BLD AUTO: 0 K/UL
NRBC BLD-RTO: 0 /100 WBC
PLATELET # BLD AUTO: 246 K/UL (ref 164–446)
PMV BLD AUTO: 10.2 FL (ref 9–12.9)
POTASSIUM SERPL-SCNC: 3.9 MMOL/L (ref 3.6–5.5)
PROT SERPL-MCNC: 7.3 G/DL (ref 6–8.2)
RBC # BLD AUTO: 5.33 M/UL (ref 4.7–6.1)
SODIUM SERPL-SCNC: 140 MMOL/L (ref 135–145)
TROPONIN T SERPL-MCNC: 17 NG/L (ref 6–19)
TROPONIN T SERPL-MCNC: 9 NG/L (ref 6–19)
WBC # BLD AUTO: 6 K/UL (ref 4.8–10.8)

## 2022-01-15 PROCEDURE — A9270 NON-COVERED ITEM OR SERVICE: HCPCS | Performed by: EMERGENCY MEDICINE

## 2022-01-15 PROCEDURE — 71045 X-RAY EXAM CHEST 1 VIEW: CPT

## 2022-01-15 PROCEDURE — 99284 EMERGENCY DEPT VISIT MOD MDM: CPT

## 2022-01-15 PROCEDURE — 85025 COMPLETE CBC W/AUTO DIFF WBC: CPT

## 2022-01-15 PROCEDURE — U0003 INFECTIOUS AGENT DETECTION BY NUCLEIC ACID (DNA OR RNA); SEVERE ACUTE RESPIRATORY SYNDROME CORONAVIRUS 2 (SARS-COV-2) (CORONAVIRUS DISEASE [COVID-19]), AMPLIFIED PROBE TECHNIQUE, MAKING USE OF HIGH THROUGHPUT TECHNOLOGIES AS DESCRIBED BY CMS-2020-01-R: HCPCS

## 2022-01-15 PROCEDURE — 700102 HCHG RX REV CODE 250 W/ 637 OVERRIDE(OP): Performed by: EMERGENCY MEDICINE

## 2022-01-15 PROCEDURE — U0005 INFEC AGEN DETEC AMPLI PROBE: HCPCS

## 2022-01-15 PROCEDURE — 84484 ASSAY OF TROPONIN QUANT: CPT

## 2022-01-15 PROCEDURE — 93005 ELECTROCARDIOGRAM TRACING: CPT

## 2022-01-15 PROCEDURE — 93005 ELECTROCARDIOGRAM TRACING: CPT | Performed by: EMERGENCY MEDICINE

## 2022-01-15 PROCEDURE — 80053 COMPREHEN METABOLIC PANEL: CPT

## 2022-01-15 RX ADMIN — LIDOCAINE HYDROCHLORIDE 30 ML: 20 SOLUTION OROPHARYNGEAL at 04:51

## 2022-01-15 ASSESSMENT — FIBROSIS 4 INDEX: FIB4 SCORE: 0.39

## 2022-01-15 NOTE — ED TRIAGE NOTES
"Chao Loving  51 y.o. M  Chief Complaint   Patient presents with   • Palpitations     x 2 hours    • Chest Pain     x 2 hours, \"like someone had punched me.\" Patient states he had this same pain when he had covid two years ago but denies any covid symptoms.      Vitals:    01/15/22 0349   BP: 127/92   Pulse: 78   Resp: 18   Temp: 36.1 °C (97 °F)   SpO2: 97%     Triage process explained to patient, apologized for wait time, and returned to lobby.  Pt informed to notify staff of any change in condition.     "

## 2022-01-15 NOTE — ED PROVIDER NOTES
"ED Provider Note    Scribed for Jeremiah Tee M.D. by Moe East. 1/15/2022  4:40 AM    Primary care provider: No primary care provider noted.  Means of arrival: Walk-in  History obtained from: Patient  History limited by: None    CHIEF COMPLAINT  Chief Complaint   Patient presents with    Palpitations     x 2 hours     Chest Pain     x 2 hours, \"like someone had punched me.\" Patient states he had this same pain when he had covid two years ago but denies any covid symptoms.      HPI  Chao Loving is a 51 y.o. male who presents to the Emergency Department for acute, mild chest pain onset 2 hours ago. Per patient, he has been experiencing left sided chest pain that radiates to his left arm. He states that his left arm feels \"sleepy\" and that his chest feels \"like someone had punched me.\" Patient states that his symptoms are similar when he was diagnosed with COVID. Denies cough, fever, or shortness of breath. No alleviating or exacerbating factors reported.    REVIEW OF SYSTEMS  Pertinent positives include chest pain and left arm pain.   Pertinent negatives include no cough, fever, or shortness of breath.    All other systems reviewed and negative. See HPI for further details.     PAST MEDICAL HISTORY   None noted    SURGICAL HISTORY  patient denies any surgical history    SOCIAL HISTORY  Social History     Tobacco Use    Smoking status: Never Smoker    Smokeless tobacco: Never Used   Vaping Use    Vaping Use: Never used   Substance Use Topics    Alcohol use: Yes    Drug use: Not Currently      Social History     Substance and Sexual Activity   Drug Use Not Currently       FAMILY HISTORY  History reviewed. No pertinent family history.    CURRENT MEDICATIONS  Home Medications       Reviewed by Neetu Birmingham R.N. (Registered Nurse) on 01/15/22 at 0403  Med List Status: Not Addressed     Medication Last Dose Status   Acetaminophen (TYLENOL EXTRA STRENGTH PO)  Active   dexamethasone (DECADRON) 4 MG " "Tab  Active   famotidine (PEPCID) 20 MG Tab  Active                  ALLERGIES  No Known Allergies    PHYSICAL EXAM  VITAL SIGNS: /84   Pulse 66   Temp 36.1 °C (97 °F) (Temporal)   Resp 18   Ht 1.651 m (5' 5\")   Wt 75.9 kg (167 lb 5.3 oz)   SpO2 95%   BMI 27.85 kg/m²     Nursing note and vitals reviewed.  Constitutional: Well-developed and well-nourished. No distress.   HENT: Head is normocephalic and atraumatic. Oropharynx is clear and moist without exudate or erythema.   Eyes: Pupils are equal, round, and reactive to light. Conjunctiva are normal.   Cardiovascular: Normal rate and regular rhythm. No murmur heard. Normal radial pulses.   Pulmonary/Chest: Breath sounds normal. No wheezes or rales. No chest wall tenderness.   Abdominal: Soft and non-tender. No distention   Musculoskeletal: Extremities exhibit normal range of motion without edema or tenderness. No calf tenderness or palpable cords.   Neurological: Awake, alert and oriented to person, place, and time. No focal deficits noted.  Skin: Skin is warm and dry. No rash.   Psychiatric: Normal mood and affect. Appropriate for clinical situation    DIAGNOSTIC STUDIES / PROCEDURES    EKG Interpretation  Interpreted by me as below    LABS  Results for orders placed or performed during the hospital encounter of 01/15/22   CBC with Differential   Result Value Ref Range    WBC 6.0 4.8 - 10.8 K/uL    RBC 5.33 4.70 - 6.10 M/uL    Hemoglobin 15.6 14.0 - 18.0 g/dL    Hematocrit 46.9 42.0 - 52.0 %    MCV 88.0 81.4 - 97.8 fL    MCH 29.3 27.0 - 33.0 pg    MCHC 33.3 (L) 33.7 - 35.3 g/dL    RDW 41.1 35.9 - 50.0 fL    Platelet Count 246 164 - 446 K/uL    MPV 10.2 9.0 - 12.9 fL    Neutrophils-Polys 57.20 44.00 - 72.00 %    Lymphocytes 28.80 22.00 - 41.00 %    Monocytes 8.50 0.00 - 13.40 %    Eosinophils 4.50 0.00 - 6.90 %    Basophils 0.70 0.00 - 1.80 %    Immature Granulocytes 0.30 0.00 - 0.90 %    Nucleated RBC 0.00 /100 WBC    Neutrophils (Absolute) 3.41 1.82 - " 7.42 K/uL    Lymphs (Absolute) 1.72 1.00 - 4.80 K/uL    Monos (Absolute) 0.51 0.00 - 0.85 K/uL    Eos (Absolute) 0.27 0.00 - 0.51 K/uL    Baso (Absolute) 0.04 0.00 - 0.12 K/uL    Immature Granulocytes (abs) 0.02 0.00 - 0.11 K/uL    NRBC (Absolute) 0.00 K/uL   Complete Metabolic Panel (CMP)   Result Value Ref Range    Sodium 140 135 - 145 mmol/L    Potassium 3.9 3.6 - 5.5 mmol/L    Chloride 108 96 - 112 mmol/L    Co2 17 (L) 20 - 33 mmol/L    Anion Gap 15.0 7.0 - 16.0    Glucose 121 (H) 65 - 99 mg/dL    Bun 17 8 - 22 mg/dL    Creatinine 0.85 0.50 - 1.40 mg/dL    Calcium 9.1 8.5 - 10.5 mg/dL    AST(SGOT) 18 12 - 45 U/L    ALT(SGPT) 27 2 - 50 U/L    Alkaline Phosphatase 83 30 - 99 U/L    Total Bilirubin 0.2 0.1 - 1.5 mg/dL    Albumin 4.7 3.2 - 4.9 g/dL    Total Protein 7.3 6.0 - 8.2 g/dL    Globulin 2.6 1.9 - 3.5 g/dL    A-G Ratio 1.8 g/dL   Troponin   Result Value Ref Range    Troponin T 9 6 - 19 ng/L   ESTIMATED GFR   Result Value Ref Range    GFR If African American >60 >60 mL/min/1.73 m 2    GFR If Non African American >60 >60 mL/min/1.73 m 2   SARS-CoV-2 PCR (24 hour In-House): Collect NP swab in VTM    Specimen: Mid-Turbinate; Respirate   Result Value Ref Range    SARS-CoV-2 Source NP Swab    TROPONIN   Result Value Ref Range    Troponin T 17 6 - 19 ng/L   EKG (NOW)   Result Value Ref Range    Report       [facility] Emergency Dept.    Test Date:  2022-01-15  Pt Name:    LÁZARO PALOMINO    Department: ER  MRN:        [id number]                      Room:  Gender:     Male                         Technician: [id number]  :        1970                   Requested By:ER TRIAGE PROTOCOL  Order #:    [id number]                    Reading MD: LOU IBARRA MD    Measurements  Intervals                                Axis  Rate:       70                           P:          39  DC:         166                          QRS:        -29  QRSD:       86                           T:           6  QT:         377  QTc:        407    Interpretive Statements  Sinus rhythm  Borderline left axis deviation  Abnormal R-wave progression, early transition  Compared to ECG 11/17/2020 01:54:38  Left ventricular hypertrophy no longer present  Electronically Signed On 1- 4:34:45 PST by JEREMIAH IBARRA MD       All labs reviewed by me.    RADIOLOGY  DX-CHEST-PORTABLE (1 VIEW)   Final Result         1.  No acute cardiopulmonary disease.        The radiologist's interpretation of all radiological studies have been reviewed by me.    COURSE & MEDICAL DECISION MAKING  Nursing notes, VS, PMSFHx reviewed in chart.     Review of past medical records shows the patient was last seen here in October 2020 for chest pain and was diagnosed with COVID-19.    4:40 AM - Patient seen and examined at bedside. Patient will be treated with GI Cocktail 30 mL PO. Ordered DX-Chest, SARS-CoV-2, estimated GFR, CBC w/ Diff, CMP, Troponin, and EKG to evaluate his symptoms. The differential diagnoses include but are not limited to: GERD vs. ACS    6:26 AM - Based on lab results, the patient's troponin is negative. Will check delta troponin.    7:04 AM - The patient's delta troponin is negative. He has a low heart score and is appropriate for outpatient follow up.    7:31 AM - Patient was reevaluated at bedside. Discussed plans and precautions for discharge. Patient understands and verbalizes agreement to the plan of discharge.    The patient will return for new or worsening symptoms and is stable at the time of discharge.    DISPOSITION:  Patient will be discharged home in stable condition.    FOLLOW UP:  [facility], Emergency Dept  [address] [address] [phone]    If symptoms worsen    FINAL IMPRESSION  1. Chest pain, unspecified type       I, Moe East (Delma), am scribing for, and in the presence of, Jeremiah Ibarra M.D..    Electronically signed by: Moe Onofre),  1/15/2022    IJeremiah M.D. personally performed the services described in this documentation, as scribed by Moe East in my presence, and it is both accurate and complete.    The note accurately reflects work and decisions made by me.  Jeremiah Tee M.D.  1/15/2022  10:35 AM

## 2022-01-15 NOTE — ED NOTES
Patient changed into gown and placed on monitor. Labs drawn and EKG completed in triage. Updated patient on plan of care, verbalized understanding. Patient wearing mask on arrival

## 2022-01-16 LAB
SARS-COV-2 RNA RESP QL NAA+PROBE: DETECTED
SPECIMEN SOURCE: ABNORMAL

## 2022-01-19 ENCOUNTER — OFFICE VISIT (OUTPATIENT)
Dept: CARDIOLOGY | Facility: PHYSICIAN GROUP | Age: 52
End: 2022-01-19
Payer: COMMERCIAL

## 2022-01-19 VITALS
HEIGHT: 65 IN | BODY MASS INDEX: 27.32 KG/M2 | HEART RATE: 73 BPM | DIASTOLIC BLOOD PRESSURE: 74 MMHG | SYSTOLIC BLOOD PRESSURE: 122 MMHG | OXYGEN SATURATION: 95 % | WEIGHT: 164 LBS | RESPIRATION RATE: 16 BRPM

## 2022-01-19 DIAGNOSIS — R07.89 OTHER CHEST PAIN: ICD-10-CM

## 2022-01-19 DIAGNOSIS — U07.1 COVID-19 VIRUS INFECTION: ICD-10-CM

## 2022-01-19 DIAGNOSIS — Z87.891 FORMER TOBACCO USE: ICD-10-CM

## 2022-01-19 DIAGNOSIS — R00.2 PALPITATIONS: ICD-10-CM

## 2022-01-19 PROCEDURE — 99204 OFFICE O/P NEW MOD 45 MIN: CPT | Mod: CS | Performed by: INTERNAL MEDICINE

## 2022-01-19 RX ORDER — OMEPRAZOLE 20 MG/1
20 CAPSULE, DELAYED RELEASE ORAL 2 TIMES DAILY
COMMUNITY
Start: 2021-11-11 | End: 2023-09-19

## 2022-01-19 ASSESSMENT — FIBROSIS 4 INDEX: FIB4 SCORE: 0.75

## 2022-01-19 NOTE — PATIENT INSTRUCTIONS
[employer] for [employer].          You can learn more about it at this website:    https://www.AwayFind/    Can be purchased online, any retailer.  $79    -Echocardiogram of the heart    -For heart racing, take metoprolol 25 mg, can take 2 times daily.    -If you feel better taking the medication, do not be afraid to take it 1 pill in the morning and 1 pill in the evening.    -Come back to see my nurse practitioner in 4 to 6 weeks to see how you are doing on the medications.    -If the tests are too expensive, you do not have to get them done.

## 2022-01-19 NOTE — PROGRESS NOTES
Subjective:   Chief Complaint:   Chief Complaint   Patient presents with   • Chest Pain     NP       Chao Loving is a 51 y.o. male who is referred by emergency room provider Dr. Jeremiah Tee for chest pain, palpitations, prior covid infection.    Patient admitted to the hospital October 2020 with cough, I was able to review the discharge summary.  Evaluated in the emergency room on 11/17/2020 for chest pain, unremarkable work-up.  Evaluated in the emergency room for chest pain 1/15/2022, also noted palpitations.  Unremarkable evaluation.  High-sensitivity troponins 8, 9, 17.  Chest x-ray normal.  Did not have stress testing.    Sometimes the heart races at night or during the  Day, happened once at night, woke him up.  Got covid 2021, then this started.  Starts having CP left chest, radiating  Central, will last a few days after the episode.  Only happening at random times but races very very quickly.  Because of the chest pain, they go together.    Can also have times where his heart rate will race only slightly fast during anxiety with which is different.      Sometimes sharp stabbing pain at rest, only a few seconds, seems different.    Quit smoking during covid.  Used to drink but not now.    Father had lung problem, non smoker, CVA.    No prior hypertension.  No prior hyperlipidemia, no recent labs.  No family history of premature coronary artery disease.  No history of diabetes.  No history of autoimmune disease such as lupus or rheumatoid arthritis.  No chronic kidney disease.  No caffeine overuse.  No recreation substance use.  No hx asthma.    Here with his wife, Elsa.  They live in [place].    , Anurag, used via tablet.    DATA REVIEWED by me:  ECG (my personal interpretation) 1/15/2022  Sinus, 70,  left axis deviation    Echo none    Most recent labs:       Lab Results   Component Value Date/Time    WBC 6.0 01/15/2022 04:07 AM    HEMOGLOBIN 15.6 01/15/2022 04:07 AM     HEMATOCRIT 46.9 01/15/2022 04:07 AM    MCV 88.0 01/15/2022 04:07 AM    INR 1.03 10/08/2020 12:52 AM      Lab Results   Component Value Date/Time    SODIUM 140 01/15/2022 04:07 AM    POTASSIUM 3.9 01/15/2022 04:07 AM    CHLORIDE 108 01/15/2022 04:07 AM    CO2 17 (L) 01/15/2022 04:07 AM    GLUCOSE 121 (H) 01/15/2022 04:07 AM    BUN 17 01/15/2022 04:07 AM    CREATININE 0.85 01/15/2022 04:07 AM      Lab Results   Component Value Date/Time    ASTSGOT 18 01/15/2022 04:07 AM    ALTSGPT 27 01/15/2022 04:07 AM    ALBUMIN 4.7 01/15/2022 04:07 AM      No results found for: CHOLSTRLTOT, LDL, HDL, TRIGLYCERIDE  No results for input(s): NTPROBNP, TROPONINT in the last 72 hours.      History reviewed. No pertinent past medical history.  History reviewed. No pertinent surgical history.  History reviewed. No pertinent family history.  Social History     Socioeconomic History   • Marital status:      Spouse name: Not on file   • Number of children: Not on file   • Years of education: Not on file   • Highest education level: Not on file   Occupational History   • Not on file   Tobacco Use   • Smoking status: Never Smoker   • Smokeless tobacco: Never Used   Vaping Use   • Vaping Use: Never used   Substance and Sexual Activity   • Alcohol use: Yes   • Drug use: Not Currently   • Sexual activity: Not on file   Other Topics Concern   • Not on file   Social History Narrative   • Not on file     Social Determinants of Health     Financial Resource Strain:    • Difficulty of Paying Living Expenses: Not on file   Food Insecurity:    • Worried About Running Out of Food in the Last Year: Not on file   • Ran Out of Food in the Last Year: Not on file   Transportation Needs:    • Lack of Transportation (Medical): Not on file   • Lack of Transportation (Non-Medical): Not on file   Physical Activity:    • Days of Exercise per Week: Not on file   • Minutes of Exercise per Session: Not on file   Stress:    • Feeling of Stress : Not on file  "  Social Connections:    • Frequency of Communication with Friends and Family: Not on file   • Frequency of Social Gatherings with Friends and Family: Not on file   • Attends Alevism Services: Not on file   • Active Member of Clubs or Organizations: Not on file   • Attends Club or Organization Meetings: Not on file   • Marital Status: Not on file   Intimate Partner Violence:    • Fear of Current or Ex-Partner: Not on file   • Emotionally Abused: Not on file   • Physically Abused: Not on file   • Sexually Abused: Not on file   Housing Stability:    • Unable to Pay for Housing in the Last Year: Not on file   • Number of Places Lived in the Last Year: Not on file   • Unstable Housing in the Last Year: Not on file     No Known Allergies    Current Outpatient Medications   Medication Sig Dispense Refill   • omeprazole (PRILOSEC) 20 MG delayed-release capsule TAKE 1 CAPSULE BY MOUTH ONCE DAILY 30 MINUTES TO 1 HOUR BEFORE MEAL     • metoprolol tartrate (LOPRESSOR) 25 MG Tab Take 1 Tablet by mouth 2 times a day as needed. 60 Tablet 11   • Acetaminophen (TYLENOL EXTRA STRENGTH PO) Take  by mouth.       No current facility-administered medications for this visit.       ROS  All others systems reviewed and negative.     Objective:     /74 (BP Location: Left arm, Patient Position: Sitting, BP Cuff Size: Adult)   Pulse 73   Resp 16   Ht 1.651 m (5' 5\")   Wt 74.4 kg (164 lb)   SpO2 95%  Body mass index is 27.29 kg/m².    General: No acute distress. Well nourished.  HEENT: EOM grossly intact, no scleral icterus, no pharyngeal erythema.   Neck:  No JVD, no bruits, trachea midline  CVS: RRR. Normal S1, S2. No M/R/G. No LE edema.  2+ radial pulses, 2+ PT pulses  Resp: CTAB. No wheezing or crackles/rhonchi. Normal respiratory effort.  Abdomen: Soft, NT, no adrian hepatomegaly.  MSK/Ext: No clubbing or cyanosis.  Skin: Warm and dry, no rashes.  Neurological: CN III-XII grossly intact. No focal deficits.   Psych: A&O x 3, " appropriate affect, good judgement        Assessment:     1. Other chest pain     2. Palpitations  EC-ECHOCARDIOGRAM COMPLETE W/O CONT    Holter Monitor Study   3. Former tobacco use     4. COVID-19 virus infection         Medical Decision Making:  Today's Assessment / Status / Plan:     -He is having 2 different types of palpitations.  -The first sounds like rhythm change of the heart resulting in CP, like SVT.  -The second sound like anxiety  -We may have trouble catching it on a monitor and I do not know the cost, we will look into the cost of Zio patch and I gave him information about a [employer] finesse  -Either way, metoprolol would be useful, he can take as needed or scheduled  -Echocardiogram would be desirable but I do not want him to get it done if it is too expensive  -Return to see nurse practitioner in 4 to 6 weeks          Written instructions given today:        Return in about 4 weeks (around 2/16/2022).    It is my pleasure to participate in the care of Mr. Mars Loving.  Please do not hesitate to contact me with questions or concerns.    Ashley Solorzano MD, [facility]  Cardiologist [facility]    Please note that this dictation was created using voice recognition software. I have made every reasonable attempt to correct obvious errors, but it is possible there are errors of grammar and possibly content that I did not discover before finalizing the note.

## 2022-02-04 ENCOUNTER — NON-PROVIDER VISIT (OUTPATIENT)
Dept: CARDIOLOGY | Facility: MEDICAL CENTER | Age: 52
End: 2022-02-04
Payer: COMMERCIAL

## 2022-02-04 DIAGNOSIS — I49.3 PVC'S (PREMATURE VENTRICULAR CONTRACTIONS): ICD-10-CM

## 2022-02-04 DIAGNOSIS — I49.1 PREMATURE ATRIAL CONTRACTIONS: ICD-10-CM

## 2022-02-05 NOTE — PROGRESS NOTES
Patient enrolled in the 14 day Zio XT Holter monitoring program, per Ashley Solorzano M.D.  >In clinic hook up, monitor serial # [id number].  >Currently pending EOS.

## 2022-02-28 ENCOUNTER — TELEPHONE (OUTPATIENT)
Dept: CARDIOLOGY | Facility: MEDICAL CENTER | Age: 52
End: 2022-02-28
Payer: COMMERCIAL

## 2022-02-28 DIAGNOSIS — R00.2 PALPITATIONS: ICD-10-CM

## 2022-03-01 PROCEDURE — 93248 EXT ECG>7D<15D REV&INTERPJ: CPT | Performed by: INTERNAL MEDICINE

## 2022-03-01 PROCEDURE — 93246 EXT ECG>7D<15D RECORDING: CPT | Performed by: INTERNAL MEDICINE

## 2022-03-02 ENCOUNTER — TELEPHONE (OUTPATIENT)
Dept: CARDIOLOGY | Facility: MEDICAL CENTER | Age: 52
End: 2022-03-02
Payer: COMMERCIAL

## 2022-03-02 NOTE — TELEPHONE ENCOUNTER
Message  Received: Yesterday  Ashley Solorzano M.D.  Ethel Cherry R.N.  Frisian-speaking.  Please let him know heart monitor was normal.  Very rare extra beats from the bottom and top part of his heart which are benign.  He did not have any patient triggers so I was not sure if those explain his palpitations.  No concerns.  Thank you.                 Holter Monitor Study      Used  Juliette, ID # [id number]. LVM for pt and provided call back number.

## 2022-03-15 ENCOUNTER — APPOINTMENT (OUTPATIENT)
Dept: CARDIOLOGY | Facility: MEDICAL CENTER | Age: 52
End: 2022-03-15
Attending: INTERNAL MEDICINE
Payer: COMMERCIAL

## 2022-03-23 ENCOUNTER — OFFICE VISIT (OUTPATIENT)
Dept: CARDIOLOGY | Facility: PHYSICIAN GROUP | Age: 52
End: 2022-03-23
Payer: COMMERCIAL

## 2022-03-23 VITALS
HEART RATE: 58 BPM | RESPIRATION RATE: 14 BRPM | WEIGHT: 167.11 LBS | HEIGHT: 65 IN | BODY MASS INDEX: 27.84 KG/M2 | SYSTOLIC BLOOD PRESSURE: 116 MMHG | DIASTOLIC BLOOD PRESSURE: 80 MMHG | OXYGEN SATURATION: 97 %

## 2022-03-23 DIAGNOSIS — Z86.16 HISTORY OF COVID-19: ICD-10-CM

## 2022-03-23 DIAGNOSIS — R00.2 PALPITATIONS: ICD-10-CM

## 2022-03-23 DIAGNOSIS — R73.9 HYPERGLYCEMIA: ICD-10-CM

## 2022-03-23 PROBLEM — J96.00 ACUTE RESPIRATORY FAILURE (HCC): Status: RESOLVED | Noted: 2020-10-08 | Resolved: 2022-03-23

## 2022-03-23 PROBLEM — J18.9 PNEUMONIA DUE TO INFECTIOUS ORGANISM: Status: RESOLVED | Noted: 2020-10-08 | Resolved: 2022-03-23

## 2022-03-23 PROCEDURE — 99214 OFFICE O/P EST MOD 30 MIN: CPT | Performed by: NURSE PRACTITIONER

## 2022-03-23 ASSESSMENT — ENCOUNTER SYMPTOMS
COUGH: 0
ABDOMINAL PAIN: 0
PND: 0
HEADACHES: 0
ORTHOPNEA: 0
MYALGIAS: 0
PALPITATIONS: 0
INSOMNIA: 0
SHORTNESS OF BREATH: 0
FEVER: 0
NAUSEA: 0
CHILLS: 0
BRUISES/BLEEDS EASILY: 0
LOSS OF CONSCIOUSNESS: 0
DIZZINESS: 0

## 2022-03-23 ASSESSMENT — FIBROSIS 4 INDEX: FIB4 SCORE: 0.75

## 2022-03-23 NOTE — PROGRESS NOTES
Chief Complaint   Patient presents with   • Follow-Up   • Palpitations   • Evaluation Of Medication Change       Subjective     Chao Loving is a 51 y.o. male who presents today for two month follow-up of palpitations.    Chao is a 51 year old male with history of Covid-19 infection in October 2020, and again in January 2022. He did require hospitalization in October 2020, and was treated with oxygen and steroids.    He saw Dr. Solorzano in January 2022 with palpitations and some shortness of breath. Low dose Metoprolol was added and ZioPatch was done; echocardiogram is also scheduled for May 2022.    He is here today for follow-up. He does feel better on the Metoprolol, less palpitations; he is wondering if he can lower the dose a little, given it does make him a little tired. He denies any chest pain, pressure or discomfort; no shortness of breath, orthopnea or PND; no dizziness or syncope; no LE edema. He sleeps well. He is still not smoking.    Past Medical History:   Diagnosis Date   • History of COVID-19 October 2020/January 2022   • History of pneumonia 10/2020   • Palpitations 03/2022    ZioPatch with normal sinus rhythm, with appropriate HR variability. No arrhythmias noted.     History reviewed. No pertinent surgical history.  History reviewed. No pertinent family history.  Social History     Socioeconomic History   • Marital status:      Spouse name: Not on file   • Number of children: Not on file   • Years of education: Not on file   • Highest education level: Not on file   Occupational History   • Not on file   Tobacco Use   • Smoking status: Never Smoker   • Smokeless tobacco: Never Used   Vaping Use   • Vaping Use: Never used   Substance and Sexual Activity   • Alcohol use: Yes     Comment: rarely   • Drug use: Not Currently   • Sexual activity: Not on file   Other Topics Concern   • Not on file   Social History Narrative   • Not on file     Social Determinants of Health  "    Financial Resource Strain: Not on file   Food Insecurity: Not on file   Transportation Needs: Not on file   Physical Activity: Not on file   Stress: Not on file   Social Connections: Not on file   Intimate Partner Violence: Not on file   Housing Stability: Not on file     No Known Allergies  Outpatient Encounter Medications as of 3/23/2022   Medication Sig Dispense Refill   • omeprazole (PRILOSEC) 20 MG delayed-release capsule Take 20 mg by mouth 2 times a day.     • metoprolol tartrate (LOPRESSOR) 25 MG Tab Take 1 Tablet by mouth 2 times a day as needed. 60 Tablet 11   • Acetaminophen (TYLENOL EXTRA STRENGTH PO) Take  by mouth every day.       No facility-administered encounter medications on file as of 3/23/2022.     Review of Systems   Constitutional: Positive for malaise/fatigue. Negative for chills and fever.   HENT: Negative for congestion.    Respiratory: Negative for cough and shortness of breath.    Cardiovascular: Negative for chest pain, palpitations, orthopnea, leg swelling and PND.   Gastrointestinal: Negative for abdominal pain and nausea.   Musculoskeletal: Negative for myalgias.   Skin: Negative for rash.   Neurological: Negative for dizziness, loss of consciousness and headaches.   Endo/Heme/Allergies: Does not bruise/bleed easily.   Psychiatric/Behavioral: The patient does not have insomnia.               Objective     /80 (BP Location: Left arm, Patient Position: Sitting, BP Cuff Size: Adult)   Pulse (!) 58   Resp 14   Ht 1.651 m (5' 5\")   Wt 75.8 kg (167 lb 1.7 oz)   SpO2 97%   BMI 27.81 kg/m²     Physical Exam  Constitutional:       Appearance: He is well-developed.   HENT:      Head: Normocephalic.   Neck:      Vascular: No JVD.   Cardiovascular:      Rate and Rhythm: Normal rate and regular rhythm.      Heart sounds: Normal heart sounds.   Pulmonary:      Effort: Pulmonary effort is normal. No respiratory distress.      Breath sounds: Normal breath sounds. No wheezing or rales. "   Abdominal:      General: Bowel sounds are normal. There is no distension.      Palpations: Abdomen is soft.      Tenderness: There is no abdominal tenderness.   Musculoskeletal:         General: Normal range of motion.      Cervical back: Normal range of motion and neck supple.   Skin:     General: Skin is warm and dry.      Findings: No rash.   Neurological:      Mental Status: He is alert and oriented to person, place, and time.     ZioPatch Summary: 13 day monitor beginning 2-4-22  1.  Sinus rhythm with appropriate heart rate range. Average 67, range 41 to 171 bpm.   2.  Normal sinus node and AV node conduction normal. No pauses.   3.  Rare PACs.   4.  Rare PVCs.   5.  No rhythm changes.   6.  No patient triggers.   Conclusion: Normal heart monitor.     Lab Results   Component Value Date/Time    WBC 6.0 01/15/2022 04:07 AM    RBC 5.33 01/15/2022 04:07 AM    HEMOGLOBIN 15.6 01/15/2022 04:07 AM    HEMATOCRIT 46.9 01/15/2022 04:07 AM    MCV 88.0 01/15/2022 04:07 AM    MCH 29.3 01/15/2022 04:07 AM    MCHC 33.3 (L) 01/15/2022 04:07 AM    MPV 10.2 01/15/2022 04:07 AM      Lab Results   Component Value Date/Time    SODIUM 140 01/15/2022 04:07 AM    POTASSIUM 3.9 01/15/2022 04:07 AM    CHLORIDE 108 01/15/2022 04:07 AM    CO2 17 (L) 01/15/2022 04:07 AM    GLUCOSE 121 (H) 01/15/2022 04:07 AM    BUN 17 01/15/2022 04:07 AM    CREATININE 0.85 01/15/2022 04:07 AM     Lab Results   Component Value Date/Time    ASTSGOT 18 01/15/2022 04:07 AM    ALTSGPT 27 01/15/2022 04:07 AM               Assessment & Plan     1. Palpitations     2. History of COVID-19     3. Hyperglycemia         Medical Decision Making: Today's Assessment/Status/Plan:      1. Palpitations, improved on Metoprolol. He can lower dose to 12.5mg BID, to see if this helps with fatigue. Reviewed ZioPatch findings, and he is reassured.    2. History of Covid-19 in October 2020 and again in January 2022. He is scheduled for echocardiogram in May 2022, and should  follow through with this.    3. Hyperglycemia, consider HgbA1c to check for early DM. We will discuss at follow-up.    Can cut Metoprolol to 12.5mg BID; keep echocardiogram appointment. Follow-up with me afterwards to discuss results.

## 2022-05-26 ENCOUNTER — HOSPITAL ENCOUNTER (OUTPATIENT)
Dept: CARDIOLOGY | Facility: MEDICAL CENTER | Age: 52
End: 2022-05-26
Attending: INTERNAL MEDICINE
Payer: COMMERCIAL

## 2022-05-26 DIAGNOSIS — R00.2 PALPITATIONS: ICD-10-CM

## 2022-05-26 LAB
LV EJECT FRACT  99904: 65
LV EJECT FRACT MOD 2C 99903: 64.6
LV EJECT FRACT MOD 4C 99902: 64.46
LV EJECT FRACT MOD BP 99901: 64.6

## 2022-05-26 PROCEDURE — 93306 TTE W/DOPPLER COMPLETE: CPT | Mod: 26 | Performed by: INTERNAL MEDICINE

## 2022-05-26 PROCEDURE — 93306 TTE W/DOPPLER COMPLETE: CPT

## 2022-09-19 ENCOUNTER — OFFICE VISIT (OUTPATIENT)
Dept: CARDIOLOGY | Facility: MEDICAL CENTER | Age: 52
End: 2022-09-19
Payer: COMMERCIAL

## 2022-09-19 VITALS
WEIGHT: 169 LBS | HEIGHT: 65 IN | OXYGEN SATURATION: 97 % | SYSTOLIC BLOOD PRESSURE: 118 MMHG | HEART RATE: 55 BPM | BODY MASS INDEX: 28.16 KG/M2 | DIASTOLIC BLOOD PRESSURE: 62 MMHG | RESPIRATION RATE: 18 BRPM

## 2022-09-19 DIAGNOSIS — R00.2 PALPITATIONS: ICD-10-CM

## 2022-09-19 DIAGNOSIS — F41.9 ANXIETY: ICD-10-CM

## 2022-09-19 DIAGNOSIS — R73.9 HYPERGLYCEMIA: ICD-10-CM

## 2022-09-19 DIAGNOSIS — R07.89 OTHER CHEST PAIN: ICD-10-CM

## 2022-09-19 DIAGNOSIS — K21.9 GASTROESOPHAGEAL REFLUX DISEASE WITHOUT ESOPHAGITIS: ICD-10-CM

## 2022-09-19 DIAGNOSIS — E78.2 MIXED HYPERLIPIDEMIA: ICD-10-CM

## 2022-09-19 PROCEDURE — 99214 OFFICE O/P EST MOD 30 MIN: CPT | Performed by: NURSE PRACTITIONER

## 2022-09-19 RX ORDER — FAMOTIDINE 40 MG/1
TABLET, FILM COATED ORAL
COMMUNITY
Start: 2022-09-01 | End: 2023-09-19

## 2022-09-19 ASSESSMENT — ENCOUNTER SYMPTOMS
HEADACHES: 0
CHILLS: 0
SHORTNESS OF BREATH: 0
NAUSEA: 0
COUGH: 0
BRUISES/BLEEDS EASILY: 0
PND: 0
INSOMNIA: 0
LOSS OF CONSCIOUSNESS: 0
PALPITATIONS: 0
NERVOUS/ANXIOUS: 1
ORTHOPNEA: 0
ABDOMINAL PAIN: 0
FEVER: 0
DIZZINESS: 0
MYALGIAS: 0

## 2022-09-19 ASSESSMENT — FIBROSIS 4 INDEX: FIB4 SCORE: 0.76

## 2022-09-19 NOTE — PROGRESS NOTES
Chief Complaint   Patient presents with    Palpitations    Follow-Up    Chest Pain       Subjective     Chao Loving is a 52 y.o. male who presents today for six month follow-up of palpitations.    Chao is a 52 year old male with history of Covid-19 infection in October 2020, and again in January 2022. He did require hospitalization in October 2020, and was treated with oxygen and steroids.     He saw Dr. Solorzano in January 2022 with palpitations and some shortness of breath. Low dose Metoprolol was added. ZiooPatch and echocardiogram were done, and both came back normal.    In March 2022, we cut Metoprolol down a little, due to fatigue.     He is here today for six month follow-up. He is doing well, and has very rare, nonsustained palpitations. He denies any chest pain, pressure or discomfort; no shortness of breath, orthopnea or PND; no dizziness or syncope; no LE edema. He sleeps well. He is not smoking. He does admit to some mild anxiety, which can wake him up at night.    Past Medical History:   Diagnosis Date    Anxiety     GERD (gastroesophageal reflux disease)     History of chest pain     History of COVID-19 October 2020/January 2022    History of pneumonia 10/2020    Palpitations 03/2022    ZioPatch with normal sinus rhythm, with appropriate HR variability. No arrhythmias noted.     History reviewed. No pertinent surgical history.  History reviewed. No pertinent family history.  Social History     Socioeconomic History    Marital status:      Spouse name: Not on file    Number of children: Not on file    Years of education: Not on file    Highest education level: Not on file   Occupational History    Not on file   Tobacco Use    Smoking status: Never    Smokeless tobacco: Never   Vaping Use    Vaping Use: Never used   Substance and Sexual Activity    Alcohol use: Yes     Comment: rarely    Drug use: Not Currently    Sexual activity: Not on file   Other Topics Concern    Not on file  "  Social History Narrative    Not on file     Social Determinants of Health     Financial Resource Strain: Not on file   Food Insecurity: Not on file   Transportation Needs: Not on file   Physical Activity: Not on file   Stress: Not on file   Social Connections: Not on file   Intimate Partner Violence: Not on file   Housing Stability: Not on file     No Known Allergies  Outpatient Encounter Medications as of 9/19/2022   Medication Sig Dispense Refill    famotidine (PEPCID) 40 MG Tab TAKE 1 TABLET BY MOUTH IN THE MORNING BEFORE MEAL      metoprolol tartrate (LOPRESSOR) 25 MG Tab Take 1 Tablet by mouth 2 times a day as needed. 60 Tablet 11    Acetaminophen (TYLENOL EXTRA STRENGTH PO) Take  by mouth every day.      omeprazole (PRILOSEC) 20 MG delayed-release capsule Take 20 mg by mouth 2 times a day. (Patient not taking: Reported on 9/19/2022)       No facility-administered encounter medications on file as of 9/19/2022.     Review of Systems   Constitutional:  Negative for chills, fever and malaise/fatigue.   HENT:  Negative for congestion.    Respiratory:  Negative for cough and shortness of breath.    Cardiovascular:  Negative for chest pain, palpitations, orthopnea, leg swelling and PND.   Gastrointestinal:  Negative for abdominal pain and nausea.   Musculoskeletal:  Negative for myalgias.   Skin:  Negative for rash.   Neurological:  Negative for dizziness, loss of consciousness and headaches.   Endo/Heme/Allergies:  Does not bruise/bleed easily.   Psychiatric/Behavioral:  The patient is nervous/anxious. The patient does not have insomnia.             Objective     /62 (BP Location: Left arm, Patient Position: Sitting, BP Cuff Size: Adult)   Pulse (!) 55   Resp 18   Ht 1.651 m (5' 5\")   Wt 76.7 kg (169 lb)   SpO2 97%   BMI 28.12 kg/m²     Physical Exam  Constitutional:       Appearance: He is well-developed.   HENT:      Head: Normocephalic.   Neck:      Vascular: No JVD.   Cardiovascular:      Rate and " Rhythm: Normal rate and regular rhythm.      Heart sounds: Normal heart sounds.   Pulmonary:      Effort: Pulmonary effort is normal. No respiratory distress.      Breath sounds: Normal breath sounds. No wheezing or rales.   Abdominal:      General: Bowel sounds are normal. There is no distension.      Palpations: Abdomen is soft.      Tenderness: There is no abdominal tenderness.   Musculoskeletal:         General: Normal range of motion.      Cervical back: Normal range of motion and neck supple.   Skin:     General: Skin is warm and dry.      Findings: No rash.   Neurological:      Mental Status: He is alert and oriented to person, place, and time.     CONCLUSIONS OF ECHOcARDIOGRAM OF 5/26/2022:  The left ventricular ejection fraction is visually estimated to be 65%.  Normal left atrial size.  Right ventricular systolic pressure is estimated to be 25 mmHg.    ZioPatch Summary: 13 day monitor beginning 2-4-22  1.  Sinus rhythm with appropriate heart rate range. Average 67, range 41 to 171 bpm.   2.  Normal sinus node and AV node conduction normal. No pauses.   3.  Rare PACs.   4.  Rare PVCs.   5.  No rhythm changes.   6.  No patient triggers.   Conclusion: Normal heart monitor.      Lab Results   Component Value Date/Time    WBC 6.0 01/15/2022 04:07 AM    RBC 5.33 01/15/2022 04:07 AM    HEMOGLOBIN 15.6 01/15/2022 04:07 AM    HEMATOCRIT 46.9 01/15/2022 04:07 AM    MCV 88.0 01/15/2022 04:07 AM    MCH 29.3 01/15/2022 04:07 AM    MCHC 33.3 (L) 01/15/2022 04:07 AM    MPV 10.2 01/15/2022 04:07 AM       Lab Results   Component Value Date/Time    SODIUM 140 01/15/2022 04:07 AM    POTASSIUM 3.9 01/15/2022 04:07 AM    CHLORIDE 108 01/15/2022 04:07 AM    CO2 17 (L) 01/15/2022 04:07 AM    GLUCOSE 121 (H) 01/15/2022 04:07 AM    BUN 17 01/15/2022 04:07 AM    CREATININE 0.85 01/15/2022 04:07 AM      Lab Results   Component Value Date/Time    ASTSGOT 18 01/15/2022 04:07 AM    ALTSGPT 27 01/15/2022 04:07 AM         Assessment &  Plan     1. Palpitations        2. Other chest pain        3. Mixed hyperlipidemia  Comp Metabolic Panel    Lipid Profile      4. Hyperglycemia  Comp Metabolic Panel      5. Anxiety        6. Gastroesophageal reflux disease without esophagitis            Medical Decision Making: Today's Assessment/Status/Plan:      1. Palpitations, stable on low dose Metoprolol. Suggested continuing same dose for now, given still occasional breakthrough symptoms.    2. History of chest pain, with normal echocardiogram. We reviewed today, and he is reassurd.    3. Hyperlipidemia, to check fasting CMP and lipid panel.    4. Hyperglycemia, to monitor over time.    5. Anxiety, mostly stable.    6. History of GERD, treated with PPI, stable. No recent symptoms.    He is doing well, and remains stable. Same medications for now. Labs as above. Follow-up in 1 year, sooner if clinical condition changes.

## 2023-05-08 ENCOUNTER — APPOINTMENT (OUTPATIENT)
Dept: RADIOLOGY | Facility: MEDICAL CENTER | Age: 53
End: 2023-05-08
Attending: EMERGENCY MEDICINE
Payer: COMMERCIAL

## 2023-05-08 ENCOUNTER — HOSPITAL ENCOUNTER (EMERGENCY)
Facility: MEDICAL CENTER | Age: 53
End: 2023-05-08
Attending: EMERGENCY MEDICINE
Payer: COMMERCIAL

## 2023-05-08 VITALS
RESPIRATION RATE: 18 BRPM | TEMPERATURE: 98 F | DIASTOLIC BLOOD PRESSURE: 65 MMHG | HEIGHT: 65 IN | SYSTOLIC BLOOD PRESSURE: 113 MMHG | WEIGHT: 171.52 LBS | OXYGEN SATURATION: 95 % | BODY MASS INDEX: 28.58 KG/M2 | HEART RATE: 67 BPM

## 2023-05-08 DIAGNOSIS — K57.32 SIGMOID DIVERTICULITIS: ICD-10-CM

## 2023-05-08 LAB
ALBUMIN SERPL BCP-MCNC: 4.5 G/DL (ref 3.2–4.9)
ALBUMIN/GLOB SERPL: 1.4 G/DL
ALP SERPL-CCNC: 89 U/L (ref 30–99)
ALT SERPL-CCNC: 23 U/L (ref 2–50)
ANION GAP SERPL CALC-SCNC: 14 MMOL/L (ref 7–16)
APPEARANCE UR: CLEAR
AST SERPL-CCNC: 16 U/L (ref 12–45)
BASOPHILS # BLD AUTO: 0.3 % (ref 0–1.8)
BASOPHILS # BLD: 0.03 K/UL (ref 0–0.12)
BILIRUB SERPL-MCNC: 0.5 MG/DL (ref 0.1–1.5)
BILIRUB UR QL STRIP.AUTO: NEGATIVE
BUN SERPL-MCNC: 13 MG/DL (ref 8–22)
CALCIUM ALBUM COR SERPL-MCNC: 9 MG/DL (ref 8.5–10.5)
CALCIUM SERPL-MCNC: 9.4 MG/DL (ref 8.5–10.5)
CHLORIDE SERPL-SCNC: 101 MMOL/L (ref 96–112)
CO2 SERPL-SCNC: 23 MMOL/L (ref 20–33)
COLOR UR: YELLOW
CREAT SERPL-MCNC: 0.93 MG/DL (ref 0.5–1.4)
EOSINOPHIL # BLD AUTO: 0.14 K/UL (ref 0–0.51)
EOSINOPHIL NFR BLD: 1.6 % (ref 0–6.9)
ERYTHROCYTE [DISTWIDTH] IN BLOOD BY AUTOMATED COUNT: 42.7 FL (ref 35.9–50)
GFR SERPLBLD CREATININE-BSD FMLA CKD-EPI: 97 ML/MIN/1.73 M 2
GLOBULIN SER CALC-MCNC: 3.2 G/DL (ref 1.9–3.5)
GLUCOSE SERPL-MCNC: 94 MG/DL (ref 65–99)
GLUCOSE UR STRIP.AUTO-MCNC: NEGATIVE MG/DL
HCT VFR BLD AUTO: 44.6 % (ref 42–52)
HGB BLD-MCNC: 15 G/DL (ref 14–18)
IMM GRANULOCYTES # BLD AUTO: 0.04 K/UL (ref 0–0.11)
IMM GRANULOCYTES NFR BLD AUTO: 0.5 % (ref 0–0.9)
KETONES UR STRIP.AUTO-MCNC: NEGATIVE MG/DL
LEUKOCYTE ESTERASE UR QL STRIP.AUTO: NEGATIVE
LIPASE SERPL-CCNC: 15 U/L (ref 11–82)
LYMPHOCYTES # BLD AUTO: 2.12 K/UL (ref 1–4.8)
LYMPHOCYTES NFR BLD: 23.9 % (ref 22–41)
MCH RBC QN AUTO: 29.2 PG (ref 27–33)
MCHC RBC AUTO-ENTMCNC: 33.6 G/DL (ref 33.7–35.3)
MCV RBC AUTO: 86.9 FL (ref 81.4–97.8)
MICRO URNS: NORMAL
MONOCYTES # BLD AUTO: 0.87 K/UL (ref 0–0.85)
MONOCYTES NFR BLD AUTO: 9.8 % (ref 0–13.4)
NEUTROPHILS # BLD AUTO: 5.67 K/UL (ref 1.82–7.42)
NEUTROPHILS NFR BLD: 63.9 % (ref 44–72)
NITRITE UR QL STRIP.AUTO: NEGATIVE
NRBC # BLD AUTO: 0 K/UL
NRBC BLD-RTO: 0 /100 WBC
PH UR STRIP.AUTO: 6 [PH] (ref 5–8)
PLATELET # BLD AUTO: 227 K/UL (ref 164–446)
PMV BLD AUTO: 10.2 FL (ref 9–12.9)
POTASSIUM SERPL-SCNC: 4 MMOL/L (ref 3.6–5.5)
PROT SERPL-MCNC: 7.7 G/DL (ref 6–8.2)
PROT UR QL STRIP: NEGATIVE MG/DL
RBC # BLD AUTO: 5.13 M/UL (ref 4.7–6.1)
RBC UR QL AUTO: NEGATIVE
SODIUM SERPL-SCNC: 138 MMOL/L (ref 135–145)
SP GR UR STRIP.AUTO: 1.02
UROBILINOGEN UR STRIP.AUTO-MCNC: 1 MG/DL
WBC # BLD AUTO: 8.9 K/UL (ref 4.8–10.8)

## 2023-05-08 PROCEDURE — 74177 CT ABD & PELVIS W/CONTRAST: CPT

## 2023-05-08 PROCEDURE — 36415 COLL VENOUS BLD VENIPUNCTURE: CPT

## 2023-05-08 PROCEDURE — 81003 URINALYSIS AUTO W/O SCOPE: CPT

## 2023-05-08 PROCEDURE — 700117 HCHG RX CONTRAST REV CODE 255: Performed by: EMERGENCY MEDICINE

## 2023-05-08 PROCEDURE — 99285 EMERGENCY DEPT VISIT HI MDM: CPT

## 2023-05-08 PROCEDURE — 85025 COMPLETE CBC W/AUTO DIFF WBC: CPT

## 2023-05-08 PROCEDURE — A9270 NON-COVERED ITEM OR SERVICE: HCPCS | Performed by: EMERGENCY MEDICINE

## 2023-05-08 PROCEDURE — 83690 ASSAY OF LIPASE: CPT

## 2023-05-08 PROCEDURE — 700111 HCHG RX REV CODE 636 W/ 250 OVERRIDE (IP): Performed by: EMERGENCY MEDICINE

## 2023-05-08 PROCEDURE — 96374 THER/PROPH/DIAG INJ IV PUSH: CPT

## 2023-05-08 PROCEDURE — 80053 COMPREHEN METABOLIC PANEL: CPT

## 2023-05-08 PROCEDURE — 700102 HCHG RX REV CODE 250 W/ 637 OVERRIDE(OP): Performed by: EMERGENCY MEDICINE

## 2023-05-08 RX ORDER — AMOXICILLIN AND CLAVULANATE POTASSIUM 875; 125 MG/1; MG/1
1 TABLET, FILM COATED ORAL ONCE
Status: COMPLETED | OUTPATIENT
Start: 2023-05-08 | End: 2023-05-08

## 2023-05-08 RX ORDER — AMOXICILLIN AND CLAVULANATE POTASSIUM 875; 125 MG/1; MG/1
1 TABLET, FILM COATED ORAL 2 TIMES DAILY
Qty: 14 TABLET | Refills: 0 | Status: ACTIVE | OUTPATIENT
Start: 2023-05-08 | End: 2023-05-15

## 2023-05-08 RX ORDER — OXYCODONE HYDROCHLORIDE AND ACETAMINOPHEN 5; 325 MG/1; MG/1
1 TABLET ORAL EVERY 4 HOURS PRN
Qty: 9 TABLET | Refills: 0 | Status: SHIPPED | OUTPATIENT
Start: 2023-05-08 | End: 2023-05-11

## 2023-05-08 RX ORDER — OXYCODONE HYDROCHLORIDE AND ACETAMINOPHEN 5; 325 MG/1; MG/1
1 TABLET ORAL ONCE
Status: COMPLETED | OUTPATIENT
Start: 2023-05-08 | End: 2023-05-08

## 2023-05-08 RX ORDER — KETOROLAC TROMETHAMINE 30 MG/ML
15 INJECTION, SOLUTION INTRAMUSCULAR; INTRAVENOUS ONCE
Status: COMPLETED | OUTPATIENT
Start: 2023-05-08 | End: 2023-05-08

## 2023-05-08 RX ADMIN — OXYCODONE AND ACETAMINOPHEN 1 TABLET: 5; 325 TABLET ORAL at 23:43

## 2023-05-08 RX ADMIN — IOHEXOL 100 ML: 350 INJECTION, SOLUTION INTRAVENOUS at 22:17

## 2023-05-08 RX ADMIN — KETOROLAC TROMETHAMINE 15 MG: 30 INJECTION, SOLUTION INTRAMUSCULAR; INTRAVENOUS at 21:28

## 2023-05-08 RX ADMIN — AMOXICILLIN AND CLAVULANATE POTASSIUM 1 TABLET: 875; 125 TABLET, FILM COATED ORAL at 23:43

## 2023-05-08 ASSESSMENT — FIBROSIS 4 INDEX: FIB4 SCORE: 0.76

## 2023-05-09 NOTE — ED PROVIDER NOTES
ED Provider Note    CHIEF COMPLAINT  Chief Complaint   Patient presents with    Abdominal Cramping     Lower abdominal pain and cramping off and on since Saturday, up to 8/10       EXTERNAL RECORDS REVIEWED  Outpatient Notes has been seen multiple times for palpitations    HPI/ROS  LIMITATION TO HISTORY   Select: : None  OUTSIDE HISTORIAN(S):  None    Chao Loving is a 52 y.o. male who presents to the emerged department chief complaint of lower abdominal discomfort since about Saturday.  He states is just progressively worsening and is kind of dull and sharp at times.  It is associated with nausea he states he took some Tylenol earlier today and only minimally of the symptoms.  He states he had some nausea and chills last night but no real fevers that he is noted.  Denies bloody stools or bloody emesis but he has had a little bit of constipation.  No dysuria no hematuria no flank pain no history of renal stones.    PAST MEDICAL HISTORY   has a past medical history of Anxiety, GERD (gastroesophageal reflux disease), History of chest pain, History of COVID-19 (October 2020/January 2022), History of pneumonia (10/2020), Hypertension, and Palpitations (03/2022).    SURGICAL HISTORY  patient denies any surgical history    FAMILY HISTORY  History reviewed. No pertinent family history.    SOCIAL HISTORY  Social History     Tobacco Use    Smoking status: Former     Types: Cigarettes    Smokeless tobacco: Never   Vaping Use    Vaping Use: Never used   Substance and Sexual Activity    Alcohol use: Yes     Comment: rarely    Drug use: Not Currently    Sexual activity: Not on file       CURRENT MEDICATIONS  Home Medications       Reviewed by Camila Altman R.N. (Registered Nurse) on 05/08/23 at 1955  Med List Status: Not Addressed     Medication Last Dose Status   Acetaminophen (TYLENOL EXTRA STRENGTH PO)  Active   famotidine (PEPCID) 40 MG Tab  Active   metoprolol tartrate (LOPRESSOR) 25 MG Tab  Active  "  omeprazole (PRILOSEC) 20 MG delayed-release capsule  Active                    ALLERGIES  No Known Allergies    PHYSICAL EXAM  VITAL SIGNS: BP (!) 149/91   Pulse 76   Temp 35.8 °C (96.5 °F) (Temporal)   Resp 16   Ht 1.651 m (5' 5\")   Wt 77.8 kg (171 lb 8.3 oz)   SpO2 97%   BMI 28.54 kg/m²    Pulse Ox Interpretation:   Pulse Ox is normal   Constitutional: Alert in no apparent distress.  HENT: Normocephalic atraumatic, MMM  Eyes: PER, Conjunctiva normal, Non-icteric.   Cardiovascular: Regular rate and rhythm, no murmurs.   Thorax & Lungs: Normal breath sounds, No respiratory distress, No wheezing, No chest tenderness.   Abdomen: Bowel sounds normal, Soft, suprapubic tenderness with localized guarding tenderness to left lower quadrant, No pulsatile masses. No peritoneal signs.  Skin: Warm, Dry, No erythema, No rash.   Back: No bony tenderness, No CVA tenderness.   Extremities/MSK: Intact equal distal pulses, No edema  Neurologic: Alert and oriented x3, No focal deficits noted.       DIAGNOSTIC STUDIES / PROCEDURES    LABS  Labs Reviewed   CBC WITH DIFFERENTIAL - Abnormal; Notable for the following components:       Result Value    MCHC 33.6 (*)     Monos (Absolute) 0.87 (*)     All other components within normal limits   COMP METABOLIC PANEL   LIPASE   URINALYSIS   CORRECTED CALCIUM   ESTIMATED GFR         RADIOLOGY  I have independently interpreted the diagnostic imaging associated with this visit and am waiting the final reading from the radiologist.   My preliminary interpretation is as follows:     Ct abd pelvis - concern for diverticulitis     Radiologist interpretation:     CT-ABDOMEN-PELVIS WITH   Final Result         1.  Changes favoring sigmoid diverticulitis. Follow-up CT or endoscopy following treatment to exclude inflammatory carcinoma which can have radiographically similar appearance.            COURSE & MEDICAL DECISION MAKING    ED Observation Status? Yes; I am placing the patient in to an " observation status due to a diagnostic uncertainty as well as therapeutic intensity. Patient placed in observation status at 8:44 PM, 5/8/2023.     Observation plan is as follows: Imaging pain meds labs    Upon Reevaluation, the patient's condition has: Improved; and will be discharged.    Patient discharged from ED Observation status at 11:20 PM  (Time) 05/08/23    (Date).     INITIAL ASSESSMENT, COURSE AND PLAN  Care Narrative: Patient resents emerged apartment with a history concerning for bowel infection such as diverticulitis colitis or an atypical appendicitis.  CBC was back on my evaluation which is pretty normal but he is pretty tender so the plan is for CT scan as well as some Toradol antiemetics if needed and n.p.o. status just in case.  Will evaluate for UTI but I doubt it especially in a male of his age doubt renal infection or stone as he has no tenderness or CVA or hematuria.    DISPOSITION AND DISCUSSIONS  11:19 PM  I reassessed the patient at the bedside.  He is resting comfortably we discussed his diagnosis of diverticulitis without evidence signs of abscess or perforation.  He will be sent home on oral Augmentin was given the first dose here as well as oral pain management.  Strict return precautions the next few days despite antibiotics for worsening pain fevers or vomiting.  He understands he feels comfortable going home and will go forward with that liquid diet for the next few days.      In prescribing controlled substances to this patient, I certify that I have obtained and reviewed the medical history of Chao Loving. I have also made a good maggy effort to obtain applicable records from other providers who have treated the patient and records did not demonstrate any increased risk of substance abuse that would prevent me from prescribing controlled substances.     I have conducted a physical exam and documented it. I have reviewed Mr. Mars Loving’s prescription history  as maintained by the [place] Prescription Monitoring Program.     I have assessed the patient’s risk for abuse, dependency, and addiction using the validated Opioid Risk Tool available at https://www.mdcalc.com/thynma-cbmo-doqu-ort-narcotic-abuse. 0    Given the above, I believe the benefits of controlled substance therapy outweigh the risks. The reasons for prescribing controlled substances include non-narcotic, oral analgesic alternatives have been inadequate for pain control. Accordingly, I have discussed the risk and benefits, treatment plan, and alternative therapies with the patient.       I have discussed management of the patient with the following physicians and RYLIE's:  non    Discussion of management with other [facility] or appropriate source(s): None     Escalation of care considered, and ultimately not performed:acute inpatient care management, however at this time, the patient is most appropriate for outpatient management    Barriers to care at this time, including but not limited to: Patient does not have established PCP.     Decision tools and prescription drugs considered including, but not limited to: Antibiotics were prescribed at this time and Pain Medications were prescribed at this time .    The patient will return for new or worsening symptoms and is stable at the time of discharge.    The patient is referred to a primary physician for blood pressure management, diabetic screening, and for all other preventative health concerns.    DISPOSITION:  Patient will be discharged home in stable condition.    FOLLOW UP:  [facility], Emergency Dept  [address] [id number] [phone]  In 3 days  If symptoms worsen      OUTPATIENT MEDICATIONS:  Discharge Medication List as of 5/8/2023 11:31 PM        START taking these medications    Details   amoxicillin-clavulanate (AUGMENTIN) 875-125 MG Tab Take 1 Tablet by mouth 2 times a day for 7 days., Disp-14 Tablet, R-0, Normal       oxyCODONE-acetaminophen (PERCOCET) 5-325 MG Tab Take 1 Tablet by mouth every four hours as needed for Severe Pain for up to 3 days., Disp-9 Tablet, R-0, Normal               FINAL DIAGNOSIS  1. Sigmoid diverticulitis           Electronically signed by: Ade Sauceda M.D., 5/8/2023 8:44 PM

## 2023-05-09 NOTE — ED TRIAGE NOTES
Chief Complaint   Patient presents with    Abdominal Cramping     Lower abdominal pain and cramping off and on since Saturday, up to 8/10     Pt ambulatory to triage for above. Denies n/v/diarrhea. Reports pain is especially bad when he is bearing down. Last BM a few hours ago, normal without blood. VSS, in NAD

## 2023-05-09 NOTE — ED NOTES
Patient provided discharge instructions. Patient verbalized understanding. Patient leaving ER in stable condition. Patient ambulatory with steady gait.  IV removed.  was used to provide discharge instructions. Patient's daughter will come to ER and drive patient home.

## 2023-09-19 ENCOUNTER — OFFICE VISIT (OUTPATIENT)
Dept: CARDIOLOGY | Facility: MEDICAL CENTER | Age: 53
End: 2023-09-19
Attending: NURSE PRACTITIONER
Payer: COMMERCIAL

## 2023-09-19 VITALS
HEIGHT: 65 IN | RESPIRATION RATE: 14 BRPM | HEART RATE: 63 BPM | SYSTOLIC BLOOD PRESSURE: 94 MMHG | BODY MASS INDEX: 27.99 KG/M2 | DIASTOLIC BLOOD PRESSURE: 66 MMHG | WEIGHT: 168 LBS | OXYGEN SATURATION: 96 %

## 2023-09-19 DIAGNOSIS — R00.2 PALPITATIONS: ICD-10-CM

## 2023-09-19 DIAGNOSIS — Z86.16 HISTORY OF COVID-19: ICD-10-CM

## 2023-09-19 DIAGNOSIS — R07.89 OTHER CHEST PAIN: ICD-10-CM

## 2023-09-19 DIAGNOSIS — E78.2 MIXED HYPERLIPIDEMIA: ICD-10-CM

## 2023-09-19 DIAGNOSIS — K21.9 GASTROESOPHAGEAL REFLUX DISEASE WITHOUT ESOPHAGITIS: ICD-10-CM

## 2023-09-19 DIAGNOSIS — F41.9 ANXIETY: ICD-10-CM

## 2023-09-19 PROCEDURE — 3078F DIAST BP <80 MM HG: CPT | Performed by: NURSE PRACTITIONER

## 2023-09-19 PROCEDURE — 3074F SYST BP LT 130 MM HG: CPT | Performed by: NURSE PRACTITIONER

## 2023-09-19 PROCEDURE — 99214 OFFICE O/P EST MOD 30 MIN: CPT | Performed by: NURSE PRACTITIONER

## 2023-09-19 PROCEDURE — 99211 OFF/OP EST MAY X REQ PHY/QHP: CPT | Performed by: NURSE PRACTITIONER

## 2023-09-19 ASSESSMENT — ENCOUNTER SYMPTOMS
INSOMNIA: 0
FEVER: 0
ABDOMINAL PAIN: 0
BRUISES/BLEEDS EASILY: 0
ORTHOPNEA: 0
NAUSEA: 0
PALPITATIONS: 0
MYALGIAS: 0
CHILLS: 0
SHORTNESS OF BREATH: 0
PND: 0
DIZZINESS: 0
COUGH: 0
HEADACHES: 0
NERVOUS/ANXIOUS: 1
LOSS OF CONSCIOUSNESS: 0

## 2023-09-19 ASSESSMENT — FIBROSIS 4 INDEX: FIB4 SCORE: 1

## 2023-09-19 NOTE — PROGRESS NOTES
Chief Complaint   Patient presents with    Follow-Up    Palpitations    Chest Pain    Gastrophageal Reflux       Subjective     Chao Loving is a 53 y.o. male who presents today for annual follow-up of palpitations, chest pain and GERD.    Chao is a 53 year old male with history of Covid-19 infection in October 2020, and again in January 2022. He did require hospitalization in October 2020, and was treated with oxygen and steroids.     He was first seen in January 2022 for evaluation of palpitations and shortness of breath. ZioPatch and echocardiogram were done, and both came back normal. Low dose Metoprolol was added.      He is here today for annual follow-up. He is doing well, and has very rare, nonsustained palpitations, mostly only with stress or anxiety. He is wondering if he can try stopping the Metoprolol.     He denies any chest pain, pressure, tightness or discomfort; no shortness of breath, orthopnea or PND; no dizziness or syncope; no LE edema. He does go to the gym and exercise regularly. He generally sleeps well, except when he has stress.    Past Medical History:   Diagnosis Date    Anxiety     GERD (gastroesophageal reflux disease)     History of chest pain     History of COVID-19 October 2020/January 2022    History of pneumonia 10/2020    Hypertension     Palpitations 03/2022    ZioPatch with normal sinus rhythm, with appropriate HR variability. No arrhythmias noted.     History reviewed. No pertinent surgical history.  History reviewed. No pertinent family history.  Social History     Socioeconomic History    Marital status:      Spouse name: Not on file    Number of children: Not on file    Years of education: Not on file    Highest education level: Not on file   Occupational History    Not on file   Tobacco Use    Smoking status: Former     Types: Cigarettes    Smokeless tobacco: Never   Vaping Use    Vaping Use: Never used   Substance and Sexual Activity    Alcohol use: Yes      Comment: rarely    Drug use: Not Currently    Sexual activity: Not on file   Other Topics Concern    Not on file   Social History Narrative    Not on file     Social Determinants of Health     Financial Resource Strain: Not on file   Food Insecurity: Not on file   Transportation Needs: Not on file   Physical Activity: Not on file   Stress: Not on file   Social Connections: Not on file   Intimate Partner Violence: Not on file   Housing Stability: Not on file     No Known Allergies  Outpatient Encounter Medications as of 9/19/2023   Medication Sig Dispense Refill    metoprolol tartrate (LOPRESSOR) 25 MG Tab Take 0.5 Tablets by mouth 2 times a day as needed (palpitations). 30 Tablet 3    Acetaminophen (TYLENOL EXTRA STRENGTH PO) Take  by mouth every day.      [DISCONTINUED] famotidine (PEPCID) 40 MG Tab TAKE 1 TABLET BY MOUTH IN THE MORNING BEFORE MEAL (Patient not taking: Reported on 9/19/2023)      [DISCONTINUED] omeprazole (PRILOSEC) 20 MG delayed-release capsule Take 20 mg by mouth 2 times a day. (Patient not taking: Reported on 9/19/2022)      [DISCONTINUED] metoprolol tartrate (LOPRESSOR) 25 MG Tab Take 1 Tablet by mouth 2 times a day as needed. (Patient taking differently: Take 12.5 mg by mouth 2 times a day.) 60 Tablet 11     No facility-administered encounter medications on file as of 9/19/2023.     Review of Systems   Constitutional:  Negative for chills, fever and malaise/fatigue.   HENT:  Negative for congestion.    Respiratory:  Negative for cough and shortness of breath.    Cardiovascular:  Negative for chest pain, palpitations, orthopnea, leg swelling and PND.   Gastrointestinal:  Negative for abdominal pain and nausea.   Musculoskeletal:  Negative for myalgias.   Skin:  Negative for rash.   Neurological:  Negative for dizziness, loss of consciousness and headaches.   Endo/Heme/Allergies:  Does not bruise/bleed easily.   Psychiatric/Behavioral:  The patient is nervous/anxious. The patient does not  "have insomnia.               Objective     BP 94/66 (BP Location: Left arm, Patient Position: Sitting, BP Cuff Size: Adult)   Pulse 63   Resp 14   Ht 1.651 m (5' 5\")   Wt 76.2 kg (168 lb)   SpO2 96%   BMI 27.96 kg/m²     Physical Exam  Constitutional:       Appearance: He is well-developed.   HENT:      Head: Normocephalic.   Neck:      Vascular: No JVD.   Cardiovascular:      Rate and Rhythm: Normal rate and regular rhythm.      Heart sounds: Normal heart sounds.   Pulmonary:      Effort: Pulmonary effort is normal. No respiratory distress.      Breath sounds: Normal breath sounds. No wheezing or rales.   Abdominal:      General: Bowel sounds are normal. There is no distension.      Palpations: Abdomen is soft.      Tenderness: There is no abdominal tenderness.   Musculoskeletal:         General: Normal range of motion.      Cervical back: Normal range of motion and neck supple.   Skin:     General: Skin is warm and dry.      Findings: No rash.   Neurological:      Mental Status: He is alert and oriented to person, place, and time.     10 year ASCVD risk: 4.3%    CONCLUSIONS OF ECHOcARDIOGRAM OF 5/26/2022:  The left ventricular ejection fraction is visually estimated to be 65%.  Normal left atrial size.  Right ventricular systolic pressure is estimated to be 25 mmHg.     ZioPatch Summary: 13 day monitor beginning 2-4-22  1.  Sinus rhythm with appropriate heart rate range. Average 67, range 41 to 171 bpm.   2.  Normal sinus node and AV node conduction normal. No pauses.   3.  Rare PACs.   4.  Rare PVCs.   5.  No rhythm changes.   6.  No patient triggers.   Conclusion: Normal heart monitor.      Component      Latest Ref Rng 6/13/2023   Sodium      136 - 144 mmol/L 138 (E)   Potassium      3.6 - 5.1 mmol/L 4.3 (E)   Chloride      102 - 110 mmol/L 107 (E)   Co2      22 - 32 mmol/L 22 (E)   Alkaline Phosphatase      38 - 126 U/L 72 (E)   AST(SGOT)      15 - 41 U/L 20 (E)   ALT(SGPT)      17 - 63 U/L 26 (E) "   Bun      8 - 20 mg/dL 13 (E)   Creatinine      0.80 - 1.40 mg/dL 0.89 (E)   Calcium      8.7 - 10.3 mg/dL 9.5 (E)   Total Protein      6.4 - 8.2 g/dL 7.4 (E)   Albumin      3.5 - 4.8 g/dL 4.3 (E)   Ag Ratio      1.0 - 2.2 ratio 1.4 (E)   Anion Gap      2 - 11 mmol/L 9 (E)   Glom Filt Rate, Est      >60 mL/min/1.7 89 (E)   Globulin      2.6 - 3.1 g/dL 3.1 (E)   Glucose      60 - 99 mg/dL 102 (H) (E)   Total Bilirubin      0.4 - 2.0 mg/dL 0.6 (E)      Cholesterol, S/P <=200 mg/dL 212 High     Triglycerides <=150 mg/dL 159 High     LDL Cholesterol <=99 mg/dL 167 High     HDL Cholesterol 40 - 59 mg/dL 40    Cholesterol/HDL Ratio <=4.5 ratio 5.3 High         Assessment & Plan     1. History of COVID-19        2. Palpitations  metoprolol tartrate (LOPRESSOR) 25 MG Tab      3. Other chest pain        4. Gastroesophageal reflux disease without esophagitis        5. Anxiety        6. Mixed hyperlipidemia            Medical Decision Making: Today's Assessment/Status/Plan:      1. History of Covid in 2020 and 2022, with subsequent palpitations and shortness of breath. Echocardiogram and ZioPatch were both normal. He can try STOPPING Metoprolol and just using PRN. If he starts having more palpitations, suggest going back to daily dose.    2. Palpitations and chest pain (atypical), with normal ZioPatch and echocardiogram.    3. GERD, previously treated with PPI, not currently taking.    4. Anxiety, mostly related to stress.    5. Hyperlipidemia, not currently on any therapy. His 10 year ASCVD risk is 4.3%. No atherosclerosis was seen on recent CT scan. Consider coronary CT calcium score in the next year or so. To work on dietary modifications.    As above, he can try stopping Metoprolol and just use PRN. Follow-up annually, sooner if clinical condition changes.

## 2024-09-23 ENCOUNTER — OFFICE VISIT (OUTPATIENT)
Dept: CARDIOLOGY | Facility: MEDICAL CENTER | Age: 54
End: 2024-09-23
Attending: NURSE PRACTITIONER
Payer: COMMERCIAL

## 2024-09-23 VITALS
DIASTOLIC BLOOD PRESSURE: 76 MMHG | SYSTOLIC BLOOD PRESSURE: 116 MMHG | RESPIRATION RATE: 14 BRPM | WEIGHT: 170 LBS | OXYGEN SATURATION: 95 % | HEIGHT: 65 IN | HEART RATE: 74 BPM | BODY MASS INDEX: 28.32 KG/M2

## 2024-09-23 DIAGNOSIS — E78.2 MIXED HYPERLIPIDEMIA: ICD-10-CM

## 2024-09-23 DIAGNOSIS — K21.9 GASTROESOPHAGEAL REFLUX DISEASE WITHOUT ESOPHAGITIS: ICD-10-CM

## 2024-09-23 DIAGNOSIS — R00.2 PALPITATIONS: ICD-10-CM

## 2024-09-23 DIAGNOSIS — F41.9 ANXIETY: ICD-10-CM

## 2024-09-23 PROCEDURE — 99214 OFFICE O/P EST MOD 30 MIN: CPT | Performed by: NURSE PRACTITIONER

## 2024-09-23 PROCEDURE — 3078F DIAST BP <80 MM HG: CPT | Performed by: NURSE PRACTITIONER

## 2024-09-23 PROCEDURE — 99211 OFF/OP EST MAY X REQ PHY/QHP: CPT | Performed by: NURSE PRACTITIONER

## 2024-09-23 PROCEDURE — 3074F SYST BP LT 130 MM HG: CPT | Performed by: NURSE PRACTITIONER

## 2024-09-23 RX ORDER — METOPROLOL TARTRATE 25 MG/1
12.5 TABLET, FILM COATED ORAL 2 TIMES DAILY PRN
Qty: 30 TABLET | Refills: 3 | Status: SHIPPED | OUTPATIENT
Start: 2024-09-23

## 2024-09-23 ASSESSMENT — ENCOUNTER SYMPTOMS
NAUSEA: 0
COUGH: 0
SHORTNESS OF BREATH: 0
ORTHOPNEA: 0
HEADACHES: 0
BRUISES/BLEEDS EASILY: 0
CHILLS: 0
FEVER: 0
PND: 0
LOSS OF CONSCIOUSNESS: 0
ABDOMINAL PAIN: 0
NERVOUS/ANXIOUS: 1
MYALGIAS: 0
INSOMNIA: 0
DIZZINESS: 0
PALPITATIONS: 0

## 2024-09-23 ASSESSMENT — FIBROSIS 4 INDEX: FIB4 SCORE: 0.9

## 2024-09-23 NOTE — PROGRESS NOTES
Chief Complaint   Patient presents with    Follow-Up    Palpitations    Hyperlipidemia    Gastrophageal Reflux    Anxiety       Subjective     Chao Loving is a 54 y.o. male who presents today for annual follow-up of palpitations, hyperlipidemia, GERD and anxiety.    Chao is a 54 year old male with history of Covid-19 infection in October 2020, and again in January 2022. He did require hospitalization in October 2020, and was treated with oxygen and steroids.     In January 2022, he was seen for evaluation of palpitations and shortness of breath. ZioPatch and echocardiogram were done, and both came back normal. Low dose Metoprolol was added. At follow-up a year ago, he asked if he could try stopping this.     He is here today for annual follow-up. He has stopped the Metoprolol, and has not had any breakthrough palpitations. About a month ago, he did have a day or two of elevated BP (170-180 systolic over 80-90 diastolic), with headache. It subsided.     He denies any chest pain, pressure, tightness or discomfort; no shortness of breath, orthopnea or PND; no dizziness or syncope; no LE edema. He remains active. He does admit to some anxiety.    Past Medical History:   Diagnosis Date    Anxiety     GERD (gastroesophageal reflux disease)     History of chest pain     History of COVID-19 October 2020/January 2022    History of pneumonia 10/2020    Hypertension     Palpitations 03/2022    ZioPatch with normal sinus rhythm, with appropriate HR variability. No arrhythmias noted.     History reviewed. No pertinent surgical history.  History reviewed. No pertinent family history.  Social History     Socioeconomic History    Marital status:      Spouse name: Not on file    Number of children: Not on file    Years of education: Not on file    Highest education level: Not on file   Occupational History    Not on file   Tobacco Use    Smoking status: Former     Types: Cigarettes    Smokeless tobacco: Never  "  Vaping Use    Vaping status: Never Used   Substance and Sexual Activity    Alcohol use: Yes     Comment: rarely    Drug use: Not Currently    Sexual activity: Not on file   Other Topics Concern    Not on file   Social History Narrative    Not on file     Social Determinants of Health     Financial Resource Strain: Not on file   Food Insecurity: Not on file   Transportation Needs: Not on file   Physical Activity: Not on file   Stress: Not on file   Social Connections: Not on file   Intimate Partner Violence: Not on file   Housing Stability: Not on file     No Known Allergies  Outpatient Encounter Medications as of 9/23/2024   Medication Sig Dispense Refill    metoprolol tartrate (LOPRESSOR) 25 MG Tab Take 0.5 Tablets by mouth 2 times a day as needed (palpitations). 30 Tablet 3    Acetaminophen (TYLENOL EXTRA STRENGTH PO) Take  by mouth every day.      [DISCONTINUED] metoprolol tartrate (LOPRESSOR) 25 MG Tab Take 0.5 Tablets by mouth 2 times a day as needed (palpitations). 30 Tablet 3     No facility-administered encounter medications on file as of 9/23/2024.     Review of Systems   Constitutional:  Negative for chills, fever and malaise/fatigue.   HENT:  Negative for congestion.    Respiratory:  Negative for cough and shortness of breath.    Cardiovascular:  Negative for chest pain, palpitations, orthopnea, leg swelling and PND.   Gastrointestinal:  Negative for abdominal pain and nausea.   Musculoskeletal:  Negative for myalgias.   Skin:  Negative for rash.   Neurological:  Negative for dizziness, loss of consciousness and headaches.   Endo/Heme/Allergies:  Does not bruise/bleed easily.   Psychiatric/Behavioral:  The patient is nervous/anxious. The patient does not have insomnia.               Objective     /76 (BP Location: Left arm, Patient Position: Sitting, BP Cuff Size: Adult)   Pulse 74   Resp 14   Ht 1.651 m (5' 5\")   Wt 77.1 kg (170 lb)   SpO2 95%   BMI 28.29 kg/m²     Physical " Exam  Constitutional:       Appearance: He is well-developed.   HENT:      Head: Normocephalic.   Neck:      Vascular: No JVD.   Cardiovascular:      Rate and Rhythm: Normal rate and regular rhythm.      Heart sounds: Normal heart sounds.   Pulmonary:      Effort: Pulmonary effort is normal. No respiratory distress.      Breath sounds: Normal breath sounds. No wheezing or rales.   Abdominal:      General: Bowel sounds are normal. There is no distension.      Palpations: Abdomen is soft.      Tenderness: There is no abdominal tenderness.   Musculoskeletal:         General: Normal range of motion.      Cervical back: Normal range of motion and neck supple.   Skin:     General: Skin is warm and dry.      Findings: No rash.   Neurological:      Mental Status: He is alert and oriented to person, place, and time.       10 year ASCVD risk: 4.3%     CONCLUSIONS OF ECHOcARDIOGRAM OF 5/26/2022:  The left ventricular ejection fraction is visually estimated to be 65%.  Normal left atrial size.  Right ventricular systolic pressure is estimated to be 25 mmHg.     ZioPatch Summary: 13 day monitor beginning 2-4-22  1.  Sinus rhythm with appropriate heart rate range. Average 67, range 41 to 171 bpm.   2.  Normal sinus node and AV node conduction normal. No pauses.   3.  Rare PACs.   4.  Rare PVCs.   5.  No rhythm changes.   6.  No patient triggers.   Conclusion: Normal heart monitor.     LABS AS OF 4/24/2024:     Component  Ref Range & Units 5 mo ago   Cholesterol, S/P  <=200 mg/dL 119   Triglycerides  <=150 mg/dL 113   LDL Cholesterol  <=99 mg/dL 79   HDL Cholesterol  40 - 59 mg/dL 32 Low    Cholesterol/HDL Ratio  <=4.5 ratio 3.7     Component      Latest Ref Rng 4/24/2024   Glycohemoglobin      4.2 - 5.8 % 5.8 (E)      Component  Ref Range & Units 5 mo ago   Sodium  136 - 144 mmol/L 140   Potassium  3.6 - 5.1 mmol/L 4.0   Chloride  102 - 110 mmol/L 107   Co2  22 - 32 mmol/L 27   Alkaline Phosphatase  38 - 126 U/L 70    AST(SGOT)  15 - 41 U/L 15   ALT(SGPT)  17 - 63 U/L 22   Bun  8 - 20 mg/dL 12   Creatinine  0.80 - 1.40 mg/dL 0.93   Calcium  8.7 - 10.3 mg/dL 9.2   Total Protein  6.4 - 8.2 g/dL 7.3   Albumin  3.5 - 4.8 g/dL 4.5   Ag Ratio  1.0 - 2.2 ratio 1.6   Glom Filt Rate, Est  >60 mL/min/1.7 84   Globulin  2.6 - 3.1 g/dL 2.8   Glucose  60 - 99 mg/dL 98   Total Bilirubin  0.4 - 2.0 mg/dL 0.5   Anion Gap  2 - 11 mmol/L 6       Assessment & Plan     1. Palpitations  metoprolol tartrate (LOPRESSOR) 25 MG Tab      2. Mixed hyperlipidemia        3. Gastroesophageal reflux disease without esophagitis        4. Anxiety            Medical Decision Making: Today's Assessment/Status/Plan:      1. Palpitations, very rare. He can continue to use Metoprolol 12.5mg BID PRN sustained palpitations.    2. Isolated episode of elevated BP. Can also use Metoprolol PRN for this. IF this continues, to notify our office.    3. Hyperlipidemia, with low 10-year ASCVD risk score of 4.3%. Consider CTCS at next follow-up.    4. GERD, not currently on any therapy, stable.    5. Anxiety, ongoing, mostly controlled.    He remains stable and continues to do well.  Can use Metoprolol PRN.  Consider CTCS at next follow-up.    Follow-up annually, sooner if clinical condition changes.

## 2025-01-13 DIAGNOSIS — R00.2 PALPITATIONS: ICD-10-CM

## 2025-01-13 NOTE — TELEPHONE ENCOUNTER
Is the patient due for a refill? Yes    Was the patient seen the past year? Yes    Date of last office visit: 9/23/24    Does the patient have an upcoming appointment?  Yes   If yes, When? 9/23/25    Provider to refill:AB    Does the patient have long-term Plus and need 100-day supply? (This applies to ALL medications) Patient does not have SCP

## 2025-01-14 RX ORDER — METOPROLOL TARTRATE 25 MG/1
TABLET, FILM COATED ORAL
Qty: 90 TABLET | Refills: 2 | Status: SHIPPED | OUTPATIENT
Start: 2025-01-14